# Patient Record
Sex: MALE | Race: WHITE | Employment: OTHER | URBAN - METROPOLITAN AREA
[De-identification: names, ages, dates, MRNs, and addresses within clinical notes are randomized per-mention and may not be internally consistent; named-entity substitution may affect disease eponyms.]

---

## 2017-01-16 ENCOUNTER — MYC MEDICAL ADVICE (OUTPATIENT)
Dept: NEUROLOGY | Facility: CLINIC | Age: 62
End: 2017-01-16

## 2017-01-19 ENCOUNTER — TELEPHONE (OUTPATIENT)
Dept: NEUROLOGY | Facility: CLINIC | Age: 62
End: 2017-01-19

## 2017-01-19 NOTE — TELEPHONE ENCOUNTER
[facility] Call Center    Phone Message    Name of Caller: [facility]    Phone Number: Other phone number:  [phone]  Option 2, then option 2 again    Best time to return call: any    May a detailed message be left on voicemail: yes    Relation to patient: Other Name: pharmacy  Relationship: pharmacy  Is there legal documentation in chart to discuss information with this person: No:  Gather information or concern from the caller.  Document in the note but do NOT release any information to the person(s).  Then send message to appropriate person, as requested by the caller.      Reason for Call: Other: pharmacy has questions about IGG infusion medication.  Please call pharmacy to advise.  Next infusion is 01.29.17.  When does Dr Hernandez need loading dose.     Action Taken: Message routed to:  Adult Clinics: Neurology p [id number]

## 2017-01-19 NOTE — TELEPHONE ENCOUNTER
Writer spoke to [facility] and clarified that at his next infusion he is to do the 40g times 2 days and then 2 weeks later go back to 70g.  Micheline Peters RN

## 2017-12-12 ENCOUNTER — OFFICE VISIT (OUTPATIENT)
Dept: PEDIATRICS | Facility: CLINIC | Age: 62
End: 2017-12-12
Payer: COMMERCIAL

## 2017-12-12 ENCOUNTER — TELEPHONE (OUTPATIENT)
Dept: NEUROLOGY | Facility: CLINIC | Age: 62
End: 2017-12-12

## 2017-12-12 ENCOUNTER — OFFICE VISIT (OUTPATIENT)
Dept: NEUROLOGY | Facility: CLINIC | Age: 62
End: 2017-12-12
Payer: COMMERCIAL

## 2017-12-12 VITALS
SYSTOLIC BLOOD PRESSURE: 162 MMHG | OXYGEN SATURATION: 97 % | BODY MASS INDEX: 23.62 KG/M2 | HEIGHT: 70 IN | WEIGHT: 165 LBS | DIASTOLIC BLOOD PRESSURE: 100 MMHG | TEMPERATURE: 97.4 F | HEART RATE: 73 BPM

## 2017-12-12 VITALS
DIASTOLIC BLOOD PRESSURE: 108 MMHG | HEART RATE: 77 BPM | BODY MASS INDEX: 23.62 KG/M2 | HEIGHT: 70 IN | TEMPERATURE: 97.6 F | OXYGEN SATURATION: 98 % | WEIGHT: 165 LBS | SYSTOLIC BLOOD PRESSURE: 170 MMHG

## 2017-12-12 DIAGNOSIS — G61.82 MMN (MULTIFOCAL MOTOR NEUROPATHY) (H): ICD-10-CM

## 2017-12-12 DIAGNOSIS — Z13.29 SCREENING FOR THYROID DISORDER: ICD-10-CM

## 2017-12-12 DIAGNOSIS — R03.0 ELEVATED BLOOD PRESSURE READING WITHOUT DIAGNOSIS OF HYPERTENSION: Primary | ICD-10-CM

## 2017-12-12 LAB
ANION GAP SERPL CALCULATED.3IONS-SCNC: 3 MMOL/L (ref 3–14)
BUN SERPL-MCNC: 11 MG/DL (ref 7–30)
CALCIUM SERPL-MCNC: 9.4 MG/DL (ref 8.5–10.1)
CHLORIDE SERPL-SCNC: 103 MMOL/L (ref 94–109)
CO2 SERPL-SCNC: 32 MMOL/L (ref 20–32)
CREAT SERPL-MCNC: 0.78 MG/DL (ref 0.66–1.25)
GFR SERPL CREATININE-BSD FRML MDRD: >90 ML/MIN/1.7M2
GLUCOSE SERPL-MCNC: 99 MG/DL (ref 70–99)
POTASSIUM SERPL-SCNC: 4.2 MMOL/L (ref 3.4–5.3)
SODIUM SERPL-SCNC: 138 MMOL/L (ref 133–144)
TSH SERPL DL<=0.005 MIU/L-ACNC: 2.04 MU/L (ref 0.4–4)

## 2017-12-12 PROCEDURE — 99214 OFFICE O/P EST MOD 30 MIN: CPT | Performed by: NURSE PRACTITIONER

## 2017-12-12 PROCEDURE — 80048 BASIC METABOLIC PNL TOTAL CA: CPT | Performed by: NURSE PRACTITIONER

## 2017-12-12 PROCEDURE — 99213 OFFICE O/P EST LOW 20 MIN: CPT | Performed by: PSYCHIATRY & NEUROLOGY

## 2017-12-12 PROCEDURE — 36415 COLL VENOUS BLD VENIPUNCTURE: CPT | Performed by: NURSE PRACTITIONER

## 2017-12-12 PROCEDURE — 84443 ASSAY THYROID STIM HORMONE: CPT | Performed by: NURSE PRACTITIONER

## 2017-12-12 RX ORDER — ATORVASTATIN CALCIUM 40 MG/1
40 TABLET, FILM COATED ORAL DAILY
Refills: 2 | COMMUNITY
Start: 2017-11-14

## 2017-12-12 NOTE — PATIENT INSTRUCTIONS
PLAN:   1.  Orders Placed This Encounter   Procedures     Basic metabolic panel     TSH with free T4 reflex       2. Patient needs to follow up in if no improvement,or sooner if worsening of symptoms or other symptoms develop.  Will follow up and/or notify patient of  results via My Chart to determine further need for followup  Please make follow up appointment with regular provider and recheck BP there  May need to consider a 24 hour BP monitor as Blood pressure at visits at home office have all been in normal range.     It was a pleasure seeing you today at the [facility]. Thank you for allowing us to care for you today. We truly hope we provided you with the excellent service you deserve. Please let us know if there is anything else we can do for you so we can be sure you are leaving completley satisfied with your care experience.       General information about your clinic   Clinic Hours Lab Hours (Appointments are required)   Mon-Thurs: 7:30 AM - 7 PM Mon-Thurs: 7:30 AM - 7 PM   Fri: 7:30 AM - 5 PM Fri: 7:30 AM - 5 PM        After Hours Nurse Advise & Appts:  [facility]: [phone]  [facility] On Call: to make appointments anytime: [phone] On Call Physician: call [phone] and answering service will page the on call physician.        For urgent appointments, please call [phone] and ask for the triage nurse or your care team clinic nurse.  How to contact my care team:  MyChart: [email or web address]   Phone: [phone]   Fax: [phone]       [facility]:   Phone: [phone]  Hours: 8:00 AM - 6:00 PM  Medication Refills:  Call your pharmacy and they will forward the refill to us. Please allow 3 business days for your refills to be completed.       Normal or non-critical lab and imaging results will be communicated to you by MyChart, letter or phone within 7 days.  If you do not hear from us within 10 days, please call the clinic. If you  have a critical or abnormal lab result, we will notify you by phone as soon as possible.       We now have PWIC (Pediatric Walk in Care)  Monday-Friday from 7:30-4. Simply walk in and be seen for your urgent needs like cough, fever, rash, diarrhea or vomiting, pink eye, UTI. No appointments needed. Ask one of the team for more information      -Your Care Team:    Dr. Elham Pantoja - Internal Medicine/Pediatrics   Dr. Rayo Castañeda - Family Medicine  Dr. Sonia Grant - Pediatrics  Dr. Kelley Miller - Pediatrics  Renetta Clarke CNP - Family Practice Nurse Practitioner

## 2017-12-12 NOTE — PROGRESS NOTES
Kourtney Moran,    Attached are your test results.  -Kidney function is normal (Cr, GFR), Sodium is normal, Potassium is normal, Calcium is normal, Glucose is normal (diabetes screening test).   -TSH (thyroid stimulating hormone) level is normal which indicates normal thyroid function.   Please contact us if you have any questions.    Renetta Clarke, CNP

## 2017-12-12 NOTE — TELEPHONE ENCOUNTER
This RN called [facility] to inform them of Dr Hernandez's message below. TORB given to the pharmacist to keep his IVIg dose at 70g every 2 weeks. The pt is scheduled to see Dr Hernandez in follow up in June.  Micheline Peters RN

## 2017-12-12 NOTE — NURSING NOTE
"Chief Complaint   Patient presents with     Hypertension       Initial BP (!) 170/108  Pulse 73  Temp 97.4  F (36.3  C) (Temporal)  Ht 5' 10\" (1.778 m)  Wt 165 lb (74.8 kg)  SpO2 97%  BMI 23.68 kg/m2 Estimated body mass index is 23.68 kg/(m^2) as calculated from the following:    Height as of this encounter: 5' 10\" (1.778 m).    Weight as of this encounter: 165 lb (74.8 kg).  Medication Reconciliation: complete    "

## 2017-12-12 NOTE — TELEPHONE ENCOUNTER
When I saw him in 2016 I reduced to 60 gms; however shortly thereafter he reported worsening weakness and we did return to 70 gms. I was not aware of that when I saw him today and he did not mention it. Let's stay at 70 gms. I would like to see him in 6 months. Thanks.

## 2017-12-12 NOTE — TELEPHONE ENCOUNTER
[facility] Call Center    Phone Message    Name of Caller: Chilo    Phone Number: Home number on file [phone] (home)    Best time to return call: any    May a detailed message be left on voicemail: yes    Relation to patient: Self    Reason for Call: Medication Question or concern regarding medication   Prescription Clarification: immune globulin - sucrose free 10 % injection     Name of Medication: immune globulin - sucrose free 10 % injection     Prescribing Provider: Dr Hernandez   Pharmacy: [facility]   What on the order needs clarification?  Patient stated that the dosage is going to change per Dr Hernandez's request, however the patient stated when it changed in the past it didn't work well for him- patient is requesting to leave it at the 70 mg range  Is patient symptomatic? No. Describe question or concern: .          Action Taken: Message routed to:  Adult Clinics: Neurology p [id number]

## 2017-12-12 NOTE — NURSING NOTE
"Chilo Moran's goals for this visit include: recheck  He requests these members of his care team be copied on today's visit information:     PCP: Joao Cage    Referring Provider:  No referring provider defined for this encounter.    Chief Complaint   Patient presents with     RECHECK       Initial BP (!) 170/109  Pulse 86  Temp 97.6  F (36.4  C) (Oral)  Ht 1.778 m (5' 10\")  Wt 74.8 kg (165 lb)  SpO2 98%  BMI 23.68 kg/m2 Estimated body mass index is 23.68 kg/(m^2) as calculated from the following:    Height as of this encounter: 1.778 m (5' 10\").    Weight as of this encounter: 74.8 kg (165 lb).  Medication Reconciliation: complete    "

## 2017-12-12 NOTE — MR AVS SNAPSHOT
After Visit Summary   12/12/2017    Chilo Moran    MRN: [id number]           Patient Information     Date Of Birth          1955        Visit Information        Provider Department      12/12/2017 8:00 AM Lukas Hernandez MD [facility]        Today's Diagnoses     MMN (multifocal motor neuropathy) (H)          Care Instructions    Thank you for choosing [facility] in [place]. It was a pleasure to see you for your office visit today.     The following is a summary of your office visit:    Medication started today: None    Medication stopped today: None    Medication dose change: Decreasing your IVIg dose to 60g every two week. [facility] has been informed.     Appointments Made today: A 6 month follow up with Dr Hernandez was made for you.     Nurse/clinic contact information: [facility] [phone]    Further instructions for your care: Call if your symptoms change or worsen.             Follow-ups after your visit        Your next 10 appointments already scheduled     Jun 12, 2018  8:00 AM CDT   Return Visit with Lukas Hernandez MD   [facility] ([facility])    [address] [place] [id number] [phone]              Who to contact     If you have questions or need follow up information about today's clinic visit or your schedule please contact [facility] directly at [phone].  Normal or non-critical lab and imaging results will be communicated to you by MyChart, letter or phone within 4 business days after the clinic has received the results. If you do not hear from us within 7 days, please contact the clinic through MyChart or phone. If you have a critical or abnormal lab result, we will notify you by phone as soon as possible.  Submit refill requests through [employer] or call your pharmacy and they will forward the refill request to us. Please allow 3 business days for your  "refill to be completed.          Additional Information About Your Visit        [employer] [employer] Information     [employer] gives you secure access to your electronic health record. If you see a primary care provider, you can also send messages to your care team and make appointments. If you have questions, please call your primary care clinic.  If you do not have a primary care provider, please call [phone] and they will assist you.      [employer] is an electronic gateway that provides easy, online access to your medical records. With [employer], you can request a clinic appointment, read your test results, renew a prescription or communicate with your care team.     To access your existing account, please contact your [facility] or call [phone] for assistance.        Care EveryWhere ID     This is your Care EveryWhere ID. This could be used by other organizations to access your [facility] medical records  [id number]        Your Vitals Were     Pulse Temperature Height Pulse Oximetry BMI (Body Mass Index)       77 97.6  F (36.4  C) (Oral) 5' 10\" (1.778 m) 98% 23.68 kg/m2        Blood Pressure from Last 3 Encounters:   12/12/17 (!) 172/111   12/13/16 (!) 146/102   09/22/15 (!) 142/97    Weight from Last 3 Encounters:   12/12/17 165 lb (74.8 kg)   12/13/16 165 lb 3.2 oz (74.9 kg)   09/22/15 163 lb 14.4 oz (74.3 kg)              Today, you had the following     No orders found for display         Where to get your medicines      Some of these will need a paper prescription and others can be bought over the counter.  Ask your nurse if you have questions.     Bring a paper prescription for each of these medications     immune globulin - sucrose free 10 % injection          Primary Care Provider Office Phone # Fax #    Joao Cage [phone]       [address] [address]        Equal Access to Services     CJ KAHN AH: Rayne lockwood " Werner, eshada lubillyadaha, moniqueta kaines urbano, luz lazaro keflaco laAlbertmarilyn ange. So [facility] [phone].    ATENCIÓN: Si solala aniya, tiene a hyman disposición servicios gratuitos de asistencia lingüística. Celeste al [phone].    We comply with applicable federal civil rights laws and [place] laws. We do not discriminate on the basis of race, color, national origin, age, disability, sex, sexual orientation, or gender identity.            Thank you!     Thank you for choosing [facility]  for your care. Our goal is always to provide you with excellent care. Hearing back from our patients is one way we can continue to improve our services. Please take a few minutes to complete the written survey that you may receive in the mail after your visit with us. Thank you!             Your Updated Medication List - Protect others around you: Learn how to safely use, store and throw away your medicines at www.disposemymeds.org.          This list is accurate as of: 12/12/17  8:51 AM.  Always use your most recent med list.                   Brand Name Dispense Instructions for use Diagnosis    ascorbic acid 500 MG tablet    VITAMIN C     Take 500 mg by mouth daily        atorvastatin 40 MG tablet    LIPITOR          immune globulin - sucrose free 10 % injection     202.44 mL    Inject 60 g into the vein every 14 days Infuse IVIg (Privigen) 60g intravenously via peripheral IV one time every two weeks. This is ordered through [facility].    MMN (multifocal motor neuropathy) (H)       MULTIPLE VITAMIN PO      Take by mouth daily

## 2017-12-12 NOTE — PROGRESS NOTES
2017           Joao Cage MD   [address] [place] [address]       RE: Kaylin Moran         MR #[id number]         :  1955      Dear Dr. Cage:      I saw Kaylin Moran in followup at the [facility], where I have seen him for management of multifocal motor neuropathy.  Mr. Moran reports his condition is stable, and he has noted no change in his mild nondisabling weakness of the left foot.  His RODS score is 50, indicating no disability from his condition.      EXAMINATION:  The patient appears well.  Blood pressure is 170/109.  It was repeated at 172/111.  He is not tachycardic.  He denies chest discomfort, difficulty breathing, headache or confusion.  He says he feels quite well today, but has noticed in the past that when he reduces his exercises, his blood pressure goes up.  With regard to his neuromuscular examination, I find full strength except for the following findings, right/left:  FDI 4+/5, ADM 5-/4 and ankle dorsiflexion 5/4.  Vibration perception is 2 at the great toes.  Pinprick perception is preserved in the right ulnar distribution.      Mr. Moran' examination is not substantially different, although I am observing some mild weakness of intrinsic hand muscles, which has not been documented previously.  When I saw him 1 year ago, I recommended reducing his immunoglobulin dose to 60 grams every 2 weeks. He had a resurgence of symptoms on that dose and we returned to 70 gms. I will maintain that dose now. I have asked that he return in 6 months so that we can monitor his condition more closely.      Finally, with regard to his blood pressure, he was seen for evaluation of this in our primary care clinic today as I know you were not available.      Sincerely,      JONE DUDLEY MD             D: 2017 08:39   T: 2017 11:45   MT: [id number]      Name:     KAYLIN MORAN   MRN:      [id number]        Account:      [id number]   :       1955      Document: [id number]       cc: Joao Cage MD

## 2017-12-12 NOTE — PROGRESS NOTES
SUBJECTIVE:   Chilo Moran is a 62 year old male who presents to clinic today for the following health issues:      Hypertension Follow-up was high today at Neuro appt.       Outpatient blood pressures are not being checked.    Low Salt Diet: low salt        Amount of exercise or physical activity: None    Problems taking medications regularly: No    Medication side effects: none    Diet: regular (no restrictions)    Never been on blood pressure medications before  Has been borderline in the past but states   Takes a low dose   No daily cold medications or NSAIDS   Blood pressure here always seems to run high but at doctor at home has not really been elevated.   Home office never high but here has been elevated.     Problem list and histories reviewed & adjusted, as indicated.  Additional history: as documented    Patient Active Problem List   Diagnosis     MMN (multifocal motor neuropathy) (H)     History reviewed. No pertinent surgical history.    Social History   Substance Use Topics     Smoking status: Former Smoker     Smokeless tobacco: Not on file     Alcohol use Not on file     Family History   Problem Relation Age of Onset     DIABETES Mother      Coronary Artery Disease Father      Hyperlipidemia Father      CEREBROVASCULAR DISEASE No family hx of      Breast Cancer No family hx of      Colon Cancer No family hx of      Prostate Cancer No family hx of      Thyroid Disease No family hx of          Current Outpatient Prescriptions   Medication Sig Dispense Refill     atorvastatin (LIPITOR) 40 MG tablet   2     immune globulin - sucrose free 10 % injection Inject 60 g into the vein every 14 days Infuse IVIg (Privigen) 60g intravenously via peripheral IV one time every two weeks. This is ordered through [facility]. 202.44 mL 11     MULTIPLE VITAMIN PO Take by mouth daily       ascorbic acid (VITAMIN C) 500 MG tablet Take 500 mg by mouth daily       No Known Allergies  BP Readings from Last 3 Encounters:  "  12/12/17 (!) 162/100   12/12/17 (!) 170/108   12/13/16 (!) 146/102    Wt Readings from Last 3 Encounters:   12/12/17 165 lb (74.8 kg)   12/12/17 165 lb (74.8 kg)   12/13/16 165 lb 3.2 oz (74.9 kg)                  Labs reviewed in EPIC        Reviewed and updated as needed this visit by clinical staff     Reviewed and updated as needed this visit by Provider         ROS:  CONSTITUTIONAL:NEGATIVE for fever, chills, change in weight  ENT/MOUTH: NEGATIVE for ear, mouth and throat problems  RESP:NEGATIVE for significant cough or SOB  CV: NEGATIVE for chest pain, palpitations or peripheral edema  GI: NEGATIVE for nausea, abdominal pain, heartburn, or change in bowel habits  MUSCULOSKELETAL: NEGATIVE for significant arthralgias or myalgia  NEURO: POSITIVE for multifocal neuropathy  and NEGATIVE for involuntary movements, gait disturbance and syncope  PSYCHIATRIC: NEGATIVE for changes in mood or affect    OBJECTIVE:     BP (!) 162/100  Pulse 73  Temp 97.4  F (36.3  C) (Temporal)  Ht 5' 10\" (1.778 m)  Wt 165 lb (74.8 kg)  SpO2 97%  BMI 23.68 kg/m2  Body mass index is 23.68 kg/(m^2).   Wt Readings from Last 4 Encounters:   12/12/17 165 lb (74.8 kg)   12/12/17 165 lb (74.8 kg)   12/13/16 165 lb 3.2 oz (74.9 kg)   09/22/15 163 lb 14.4 oz (74.3 kg)       GENERAL APPEARANCE: alert, active and no distress  HENT: ear canals and TM's normal and nose and mouth without ulcers or lesions  RESP: lungs clear to auscultation - no rales, rhonchi or wheezes  CV: regular rates and rhythm, no murmur, click or rub, no irregular beats and peripheral pulses strong  ABDOMEN: soft, non-tender  MS: extremities normal- no gross deformities noted  SKIN: no suspicious lesions or rashes  NEURO: Normal strength and tone, mentation intact, speech normal and normal strength throughout  PSYCH: mentation appears normal and affect normal/bright    Diagnostic Test Results:  Results for orders placed or performed in visit on 12/12/17   Basic metabolic " panel   Result Value Ref Range    Sodium 138 133 - 144 mmol/L    Potassium 4.2 3.4 - 5.3 mmol/L    Chloride 103 94 - 109 mmol/L    Carbon Dioxide 32 20 - 32 mmol/L    Anion Gap 3 3 - 14 mmol/L    Glucose 99 70 - 99 mg/dL    Urea Nitrogen 11 7 - 30 mg/dL    Creatinine 0.78 0.66 - 1.25 mg/dL    GFR Estimate >90 >60 mL/min/1.7m2    GFR Estimate If Black >90 >60 mL/min/1.7m2    Calcium 9.4 8.5 - 10.1 mg/dL   TSH with free T4 reflex   Result Value Ref Range    TSH 2.04 0.40 - 4.00 mU/L       ASSESSMENT/PLAN:       Chilo was seen today for hypertension.    Diagnoses and all orders for this visit:    Elevated blood pressure reading without diagnosis of hypertension  -     Basic metabolic panel    Screening for thyroid disorder  -     TSH with free T4 reflex    PLAN:   Patient needs to follow up in if no improvement,or sooner if worsening of symptoms or other symptoms develop.  Will follow up and/or notify patient of  results via My Chart to determine further need for followup  Please make follow up appointment with regular provider and recheck BP there  May need to consider a 24 hour BP monitor as Blood pressure at visits at home office have all been in normal range  Patient verbalized agreement to plan     See Patient Instructions    ANNABELLA Copeland CNP  [facility]

## 2018-06-12 ENCOUNTER — OFFICE VISIT (OUTPATIENT)
Dept: NEUROLOGY | Facility: CLINIC | Age: 63
End: 2018-06-12
Payer: COMMERCIAL

## 2018-06-12 VITALS
WEIGHT: 162.4 LBS | HEART RATE: 70 BPM | OXYGEN SATURATION: 99 % | DIASTOLIC BLOOD PRESSURE: 91 MMHG | SYSTOLIC BLOOD PRESSURE: 138 MMHG | BODY MASS INDEX: 23.25 KG/M2 | HEIGHT: 70 IN

## 2018-06-12 DIAGNOSIS — G61.82 MMN (MULTIFOCAL MOTOR NEUROPATHY) (H): Primary | ICD-10-CM

## 2018-06-12 PROCEDURE — 99212 OFFICE O/P EST SF 10 MIN: CPT | Performed by: PSYCHIATRY & NEUROLOGY

## 2018-06-12 RX ORDER — LISINOPRIL/HYDROCHLOROTHIAZIDE 10-12.5 MG
1 TABLET ORAL DAILY
Refills: 0 | COMMUNITY
Start: 2018-06-06

## 2018-06-12 ASSESSMENT — PAIN SCALES - GENERAL: PAINLEVEL: NO PAIN (0)

## 2018-06-12 NOTE — NURSING NOTE
"Chilo Moran's goals for this visit include: return  He requests these members of his care team be copied on today's visit information:     PCP: Joao Cage    Referring Provider:  No referring provider defined for this encounter.    BP (!) 138/91  Pulse 70  Ht 1.778 m (5' 10\")  Wt 73.7 kg (162 lb 6.4 oz)  SpO2 99%  BMI 23.3 kg/m2    Do you need any medication refills at today's visit? n  "

## 2018-06-12 NOTE — MR AVS SNAPSHOT
After Visit Summary   6/12/2018    Chilo Moran    MRN: [id number]           Patient Information     Date Of Birth          1955        Visit Information        Provider Department      6/12/2018 8:00 AM Lukas Hernandez MD [facility]        Today's Diagnoses     MMN (multifocal motor neuropathy) (H)    -  1       Follow-ups after your visit        Follow-up notes from your care team     Return in about 6 months (around 12/12/2018).      Your next 10 appointments already scheduled     Dec 18, 2018  8:00 AM CST   Return Visit with Lukas Hernandez MD   [facility] ([facility])    [id number] [place] [id number] [phone]              Who to contact     If you have questions or need follow up information about today's clinic visit or your schedule please contact [facility] directly at [phone].  Normal or non-critical lab and imaging results will be communicated to you by [employer], letter or phone within 4 business days after the clinic has received the results. If you do not hear from us within 7 days, please contact the clinic through [employer] or phone. If you have a critical or abnormal lab result, we will notify you by phone as soon as possible.  Submit refill requests through [employer] or call your pharmacy and they will forward the refill request to us. Please allow 3 business days for your refill to be completed.          Additional Information About Your Visit        MyChart Information     [employer] gives you secure access to your electronic health record. If you see a primary care provider, you can also send messages to your care team and make appointments. If you have questions, please call your primary care clinic.  If you do not have a primary care provider, please call [phone] and they will assist you.      [employer] is an electronic gateway that provides easy, online access to your medical records.  "With MyChart, you can request a clinic appointment, read your test results, renew a prescription or communicate with your care team.     To access your existing account, please contact your [facility] or call [phone] for assistance.        Care EveryWhere ID     This is your Care EveryWhere ID. This could be used by other organizations to access your [facility] medical records  [id number]        Your Vitals Were     Pulse Height Pulse Oximetry BMI (Body Mass Index)          70 1.778 m (5' 10\") 99% 23.3 kg/m2         Blood Pressure from Last 3 Encounters:   06/12/18 (!) 138/91   12/12/17 (!) 162/100   12/12/17 (!) 170/108    Weight from Last 3 Encounters:   06/12/18 73.7 kg (162 lb 6.4 oz)   12/12/17 74.8 kg (165 lb)   12/12/17 74.8 kg (165 lb)              Today, you had the following     No orders found for display       Primary Care Provider Office Phone # Fax #    Joao Cage [phone]       [address] [address]        Equal Access to Services     CJ KAHN : Hadii roslyn ramirezo Sosimón, waaxda luqadaha, qaybta kaalmada adeegyada, luz cassidy. So [facility] [phone].    ATENCIÓN: Si habla español, tiene a hyman disposición servicios gratuitos de asistencia lingüística. [facility] [phone].    We comply with applicable federal civil rights laws and [place] laws. We do not discriminate on the basis of race, color, national origin, age, disability, sex, sexual orientation, or gender identity.            Thank you!     Thank you for choosing [facility]  for your care. Our goal is always to provide you with excellent care. Hearing back from our patients is one way we can continue to improve our services. Please take a few minutes to complete the written survey that you may receive in the mail after your visit with us. Thank you!             Your Updated Medication List - " Protect others around you: Learn how to safely use, store and throw away your medicines at www.disposemymeds.org.          This list is accurate as of 6/12/18  1:08 PM.  Always use your most recent med list.                   Brand Name Dispense Instructions for use Diagnosis    ascorbic acid 500 MG tablet    VITAMIN C     Take 500 mg by mouth daily        atorvastatin 40 MG tablet    LIPITOR     Take 40 mg by mouth daily        immune globulin - sucrose free 10 % injection      Inject 70 g into the vein every 14 days Ordered through [facility].    MMN (multifocal motor neuropathy) (H)       lisinopril-hydrochlorothiazide 10-12.5 MG per tablet    PRINZIDE/ZESTORETIC     Take 1 tablet by mouth daily        MULTIPLE VITAMIN PO      Take by mouth daily

## 2018-06-12 NOTE — LETTER
6/12/2018         RE: Chilo Moran  [address] [place] [id number]        Dear Colleague,    Thank you for referring your patient, Chilo Moran, to the [facility]. Please see a copy of my visit note below.    Return visit for MMN. No new symptoms. Creatinine normal 12/2017.      Mental state: Alert, appropriate, speech, language, and thought content normal.     Manual muscle testing (A indicates atrophy):   Right Left   Shoulder abduction:  5 5   Elbow extension: 5 5   Elbow flexion:  5 5   Wrist extension:  5 5   FDI 5 5   APB 5 5   Hip flexion 5 5   Knee flexion 5 5   Knee extension 5 5   Dorsiflexion 5 4   Plantar flexion 5 5     Inversion and eversion normal on left.    Muscle tone:   Right Left   Upper limb Normal Normal   Lower limb Normal Normal      RODS-MMN 50.    Impression:    MMN. Stable.     Discussed SCIg - not interested.    Plan:    Continue current IVIg. RTC 6 months.    Lukas Hernandez M.D.      Again, thank you for allowing me to participate in the care of your patient.        Sincerely,        Lukas Hernandez MD

## 2018-06-12 NOTE — PROGRESS NOTES
Return visit for MMN. No new symptoms. Creatinine normal 12/2017.      Mental state: Alert, appropriate, speech, language, and thought content normal.     Manual muscle testing (A indicates atrophy):   Right Left   Shoulder abduction:  5 5   Elbow extension: 5 5   Elbow flexion:  5 5   Wrist extension:  5 5   FDI 5 5   APB 5 5   Hip flexion 5 5   Knee flexion 5 5   Knee extension 5 5   Dorsiflexion 5 4   Plantar flexion 5 5     Inversion and eversion normal on left.    Muscle tone:   Right Left   Upper limb Normal Normal   Lower limb Normal Normal      RODS-MMN 50.    Impression:    MMN. Stable.     Discussed SCIg - not interested.    Plan:    Continue current IVIg. RTC 6 months.    Lukas Hernandez M.D.

## 2018-09-17 ENCOUNTER — TRANSFERRED RECORDS (OUTPATIENT)
Dept: HEALTH INFORMATION MANAGEMENT | Facility: CLINIC | Age: 63
End: 2018-09-17

## 2018-09-17 LAB
ALT SERPL-CCNC: 40 U/L (ref 10–65)
AST SERPL-CCNC: 34 U/L (ref 15–37)
CHOLEST SERPL-MCNC: 178 MG/DL (ref 100–200)
CREAT SERPL-MCNC: 0.8 MG/DL (ref 0.6–1.3)
GFR SERPL CREATININE-BSD FRML MDRD: 98 ML/MIN/1.73M2
GLUCOSE SERPL-MCNC: 120 MG/DL (ref 70–99)
HDLC SERPL-MCNC: 47 MG/DL (ref 35–60)
LDLC SERPL CALC-MCNC: 76 MG/DL
POTASSIUM SERPL-SCNC: 3.6 MMOL/L (ref 6.5–5.3)
TRIGL SERPL-MCNC: 274 MG/DL (ref 10–190)

## 2019-01-15 ENCOUNTER — MEDICAL CORRESPONDENCE (OUTPATIENT)
Dept: HEALTH INFORMATION MANAGEMENT | Facility: CLINIC | Age: 64
End: 2019-01-15

## 2019-01-26 ENCOUNTER — MYC REFILL (OUTPATIENT)
Dept: OTHER | Age: 64
End: 2019-01-26

## 2019-01-26 DIAGNOSIS — G61.82 MMN (MULTIFOCAL MOTOR NEUROPATHY) (H): ICD-10-CM

## 2019-01-28 NOTE — TELEPHONE ENCOUNTER
Local Print Rx was faxed to [facility]. Pt informed to call them and request a navarro delivery. Tasha Gonzalez RN

## 2019-01-28 NOTE — TELEPHONE ENCOUNTER
[facility] Call Center    Phone Message    May a detailed message be left on voicemail: yes    Reason for Call: Other: Pt called in about the status of this medication. Pt states he was due to take this yesterday. Please advise.      Action Taken: Message routed to:  Adult Clinics: Neurology p [id number]

## 2019-01-29 ENCOUNTER — TELEPHONE (OUTPATIENT)
Dept: NEUROLOGY | Facility: CLINIC | Age: 64
End: 2019-01-29

## 2019-01-29 NOTE — TELEPHONE ENCOUNTER
Spoke to the pt and would like Dr Hernandez to be aware that he is 2 days late on his infusion (due Sunday), see documentation in the encounter from 1/26/19. It sounds like he may not get it for another couple days.   Normally takes 2 stairs at a time and felt like he may have a little more difficulty than normal but no decline in his left foot drop.   He would like to know if Dr Hernandez would like him to get an increased dose later this week or wait to see how he does after he gets his infusion.  Routed to Dr Hernandez to review and advise.   Micheline Peters RN

## 2019-01-29 NOTE — TELEPHONE ENCOUNTER
[facility] Call Center    Phone Message    May a detailed message be left on voicemail: yes    Reason for Call: Other: Pt is calling really fustrated and desperate (pt's states) requesting a call back regarding his medication (immune globulin - sucrose free 10 % injection) Pt states he has been without medication for 2 days now and needs someone to call him about this he states he has been waiting since yesterday for a call back. Please Advise    Action Taken: Message routed to:  Adult Clinics: Neurology p [id number]

## 2019-01-30 NOTE — TELEPHONE ENCOUNTER
Patient is requesting an urgent call back.  He stated his IV Immune Globulin has not shipped yet and he is going into a deficit.  Please advise.  Thank you.

## 2019-01-30 NOTE — TELEPHONE ENCOUNTER
"Spoke to the pt and he was informed that I spoke to [facility] and they submitted the PA yesterday marked \"URGENT\". In the meantime, they have requested an exception from his insurance company to allow them to ship one dose to his home now while we wait for the PA to get approved.   I reassured him that we everyone is doing what they can to move this process along. I will contact him as soon as I have more information.  Micheline Peters RN    "

## 2019-02-18 ENCOUNTER — OFFICE VISIT (OUTPATIENT)
Dept: UROLOGY | Facility: CLINIC | Age: 64
End: 2019-02-18
Payer: COMMERCIAL

## 2019-02-18 ENCOUNTER — OFFICE VISIT (OUTPATIENT)
Dept: NEUROLOGY | Facility: CLINIC | Age: 64
End: 2019-02-18
Payer: COMMERCIAL

## 2019-02-18 VITALS
HEART RATE: 85 BPM | SYSTOLIC BLOOD PRESSURE: 124 MMHG | DIASTOLIC BLOOD PRESSURE: 81 MMHG | HEIGHT: 70 IN | WEIGHT: 164.4 LBS | BODY MASS INDEX: 23.54 KG/M2

## 2019-02-18 VITALS
WEIGHT: 164 LBS | HEIGHT: 70 IN | DIASTOLIC BLOOD PRESSURE: 81 MMHG | HEART RATE: 85 BPM | BODY MASS INDEX: 23.48 KG/M2 | SYSTOLIC BLOOD PRESSURE: 124 MMHG | OXYGEN SATURATION: 98 %

## 2019-02-18 DIAGNOSIS — N48.6 PEYRONIE'S DISEASE: Primary | ICD-10-CM

## 2019-02-18 DIAGNOSIS — R09.89 ABSENT FOOT PULSE: Primary | ICD-10-CM

## 2019-02-18 PROCEDURE — 99213 OFFICE O/P EST LOW 20 MIN: CPT | Performed by: PSYCHIATRY & NEUROLOGY

## 2019-02-18 PROCEDURE — 99204 OFFICE O/P NEW MOD 45 MIN: CPT | Performed by: UROLOGY

## 2019-02-18 RX ORDER — CEFAZOLIN SODIUM 1 G
1 VIAL (EA) INJECTION SEE ADMIN INSTRUCTIONS
Status: CANCELLED | OUTPATIENT
Start: 2019-02-18

## 2019-02-18 ASSESSMENT — MIFFLIN-ST. JEOR
SCORE: 1546.96
SCORE: 1545.15

## 2019-02-18 ASSESSMENT — PAIN SCALES - GENERAL
PAINLEVEL: NO PAIN (0)
PAINLEVEL: NO PAIN (0)

## 2019-02-18 NOTE — PROGRESS NOTES
I am seeing Chilo Moran in consultation from Joao Cage for evaluation of Peyronie's disease.    HPI:  Chilo Moran is a 64 year old male with Peyronie's disease that was first noticed 5/2017.  He reports difficulty with penetration due to a ~45 degree dorsal curvature at the base of his penis   No history of Dupuytren's contractures.   No ED.  Firmness of erections is adequate for intercourse.    REVIEW OF SYSTEMS:  General: negative  Skin: negative  Eyes: negative  Ears/Nose/Throat: negative  Respiratory: negative  Cardiovascular: negative  Gastrointestinal: negative  Genitourinary: see HPI  Musculoskeletal: negative  Neurologic: negative  Psychiatric: negative  Hematologic/Lymphatic/Immunologic: negative  Endocrine: negative    PAST MEDICAL HX:  He has multifocal motor neuropathy.    PAST SURG HX:  Infusion port.    FAMILY HX:  Family History   Problem Relation Age of Onset     Diabetes Mother      Coronary Artery Disease Father      Hyperlipidemia Father      Cerebrovascular Disease No family hx of      Breast Cancer No family hx of      Colon Cancer No family hx of      Prostate Cancer No family hx of      Thyroid Disease No family hx of        SOCIAL HX:  Social History     Tobacco Use     Smoking status: Former Smoker     Smokeless tobacco: Never Used   Substance Use Topics     Alcohol use: Not on file     Drug use: Not on file       MEDICATIONS:  Current Outpatient Medications   Medication Sig     ascorbic acid (VITAMIN C) 500 MG tablet Take 500 mg by mouth daily     atorvastatin (LIPITOR) 40 MG tablet Take 40 mg by mouth daily      immune globulin - sucrose free 10 % injection Inject 70 g into the vein every 14 days Ordered through [facility].     lisinopril-hydrochlorothiazide (PRINZIDE/ZESTORETIC) 10-12.5 MG per tablet Take 1 tablet by mouth daily     MULTIPLE VITAMIN PO Take by mouth daily     No current facility-administered medications for this visit.        ALLERGIES:  Patient has no known  "allergies.      GENERAL PHYSICAL EXAM:     /81   Pulse 85   Ht 1.778 m (5' 10\")   Wt 74.6 kg (164 lb 6.4 oz)   BMI 23.59 kg/m     Constitutional: No acute distress. Well nourished.   PSYCH: normal mood and affect.  NEURO: normal gait, no focal deficits.   EYES: anicteric, EOMI, PERR.  ENT: neck supple, no lymphadenopathy, mucosae moist, no thrush.  CARDIOPULMONARY: breathing non-labored, pulse regular rate/rhythm, no peripheral edema.  GI: Abdomen soft, non-tender, no surgical scars, no organomegaly.  MUSCULOSKELETAL: normal limb proportions, no muscle wasting, no contractures.  SKIN: Normal virilized hair distribution, no lesions, warts or rashes over genitalia, abdomen extremities or face.  HEME/LYMPH: no ecchymosis, no lymphadenopathy in groin, no lymphedema.     EXAM:  Phallus circumcised, meatus adequate, dense dorsal midline plaque palpated.   Left testis descended, size is normal, consistency is normal. No intra-testicular masses.   Right testis descended, size is normal, consistency is normal. No intra-testicular masses.   Epididymes present, non-tender, not-enlarged.     Prostate exam: deferred     Imaging/labs:  Lab Results   Component Value Date    CR 0.78 12/12/2017    CR 0.77 12/13/2016    CR 0.86 09/22/2015       ASSESSMENT:   Peyronie's disease     PLAN:  Peyronie's disease.  - discussed observation is an option.  - discussed mechanical therapy of Xiaflex plaque injections    - discussed surgical therapies including plication, patch graft, penile prosthesis as a last resort.     He is a good candidate for plication with firm erections and ~45 degree dorsal curve    The risks of plication surgery were explained. I stressed to him that shortening can occur, but we can make him functionally straight rather than perfectly straight to avoid excessive shortening.  The goal is functional, not perfection.  Discussed that de josh ED is about 5% risk with plication.  Other risks include recurrence " of curvature, pain from sutures, palpable sutures.     The risks of plaque excision and grafting were discussed (numbness, continued curvature, de josh erectile dysfunction, pain, infection, possible plication also required).     He would like to proceed with the plication.  Surgery orders made.      Copied cc to Consulting provider Joao Cage        Thank-you for the kind consultation.  Dmitriy Wiley MD     Urological Surgeon

## 2019-02-18 NOTE — PROGRESS NOTES
"Return visit for 64 year old man with MMN. He was doing well until recently, when there was an insurance-related delay in receiving IVIg of 5 days. During that time he developed proximal LE weakness which has since resolved. No other neurological complaints. He does point out in the course of examination that the tip of the left great toe has been \"discolored.\" I do not know the duration of this observation but do not believe it to be abrupt or acute.    R-ODS 48.    Exam:    Mild broad erythema in tip of left great toe, which is quite weak. No skin breakdown, no punctate lesions, no petechiae. Skin is intact and appears well-perfused based upon the general color in the foot, although DP pulse is readily appreciated on the right but not the left. He does not smoke. No swelling.      Mental state: Alert, appropriate, speech, language, and thought content normal.       Sensory examination:   Right Left   Vibration (Rydell-Seiffer) 5 5   Legend:   MM = medial malleolus, TT = tibial tuberosity, K = patella, MCP = MCP joint  MF = mid-foot, DC = distal calf, MC = mid calf, PC = proximal calf      Manual muscle testing (A indicates atrophy):   Right Left   Shoulder abduction  5 5   Elbow extension 5 5   Elbow flexion 5 5   Wrist extension  5 5   Finger extension 4+ 5   FDI 5- 5   APB 5 5   Hip flexion 5 5   Knee flexion 5 5   Knee extension 5 5   Dorsiflexion 5 4-   Plantar flexion 5 5     Muscle tone:   Right Left   Upper limb Normal Normal   Lower limb Normal Normal       Gait:  Normal.      Impression:  1. MMN - trace worsening of MMT grades with no functional compromise. This may reflect improving weakness after recent delay in treatment.   2. Discoloration in toe is not concerning to me, but there is an asymmetry of pedal pulses.    Recommendations (copied from AVS):    1. Continue current infusions.  2. Let me know if you get weaker.  3. We will test the blood flow in your legs.  4. Return in 4-6 months.      Note " that he is up to date on annual creatinine.

## 2019-02-18 NOTE — NURSING NOTE
"Chilo Moran's goals for this visit include: return  He requests these members of his care team be copied on today's visit information:     PCP: Joao Cage    Referring Provider:  No referring provider defined for this encounter.    /81   Pulse 85   Ht 1.778 m (5' 10\")   Wt 74.4 kg (164 lb)   SpO2 98%   BMI 23.53 kg/m      Do you need any medication refills at today's visit? n  "

## 2019-02-18 NOTE — NURSING NOTE
"Chilo Moran's goals for this visit include:   Chief Complaint   Patient presents with     Consult     Peyronie Disease       He requests these members of his care team be copied on today's visit information: yes    PCP: Joao Cage    Referring Provider:  Joao GARDINER [address] [address]    /81   Pulse 85   Ht 1.778 m (5' 10\")   Wt 74.6 kg (164 lb 6.4 oz)   BMI 23.59 kg/m      Do you need any medication refills at today's visit? No    Ana Spain CMA      "

## 2019-02-18 NOTE — PATIENT INSTRUCTIONS
1. Continue current infusions.  2. Let me know if you get weaker.  3. We will test the blood flow in your legs.  4. Return in 4-6 months.

## 2019-02-18 NOTE — LETTER
"    2/18/2019         RE: Chilo Moran  [address] [facility] [address]        Dear Colleague,    Thank you for referring your patient, Chilo Moran, to the [facility]. Please see a copy of my visit note below.    Return visit for 64 year old man with MMN. He was doing well until recently, when there was an insurance-related delay in receiving IVIg of 5 days. During that time he developed proximal LE weakness which has since resolved. No other neurological complaints. He does point out in the course of examination that the tip of the left great toe has been \"discolored.\" I do not know the duration of this observation but do not believe it to be abrupt or acute.    R-ODS 48.    Exam:    Mild broad erythema in tip of left great toe, which is quite weak. No skin breakdown, no punctate lesions, no petechiae. Skin is intact and appears well-perfused based upon the general color in the foot, although DP pulse is readily appreciated on the right but not the left. He does not smoke. No swelling.      Mental state: Alert, appropriate, speech, language, and thought content normal.       Sensory examination:   Right Left   Vibration (Rydell-Seiffer) 5 5   Legend:   MM = medial malleolus, TT = tibial tuberosity, K = patella, MCP = MCP joint  MF = mid-foot, DC = distal calf, MC = mid calf, PC = proximal calf      Manual muscle testing (A indicates atrophy):   Right Left   Shoulder abduction  5 5   Elbow extension 5 5   Elbow flexion 5 5   Wrist extension  5 5   Finger extension 4+ 5   FDI 5- 5   APB 5 5   Hip flexion 5 5   Knee flexion 5 5   Knee extension 5 5   Dorsiflexion 5 4-   Plantar flexion 5 5     Muscle tone:   Right Left   Upper limb Normal Normal   Lower limb Normal Normal       Gait:  Normal.      Impression:  1. MMN - trace worsening of MMT grades with no functional compromise. This may reflect improving weakness after recent delay in treatment.   2. Discoloration in toe is not concerning to " me, but there is an asymmetry of pedal pulses.    Recommendations (copied from AVS):    1. Continue current infusions.  2. Let me know if you get weaker.  3. We will test the blood flow in your legs.  4. Return in 4-6 months.      Note that he is up to date on annual creatinine.      Again, thank you for allowing me to participate in the care of your patient.        Sincerely,        Lukas Hernandez MD

## 2019-02-21 ENCOUNTER — TELEPHONE (OUTPATIENT)
Dept: UROLOGY | Facility: CLINIC | Age: 64
End: 2019-02-21

## 2019-02-21 NOTE — TELEPHONE ENCOUNTER
Patient is scheduled for surgery with Dr. Wiley      Spoke or left message with: Chilo    Date of Surgery: 4/16/19    Location: [facility] OR    Informed patient they will need an adult  yes    Pre-op with surgeon (if applicable): n/a    H&P: Scheduled with pcp    Additional imaging/appointments: n/a    Surgery packet: mailed 2/22/19     Additional comments: n/a

## 2019-04-05 ENCOUNTER — TRANSFERRED RECORDS (OUTPATIENT)
Dept: HEALTH INFORMATION MANAGEMENT | Facility: CLINIC | Age: 64
End: 2019-04-05

## 2019-04-08 ENCOUNTER — TRANSFERRED RECORDS (OUTPATIENT)
Dept: HEALTH INFORMATION MANAGEMENT | Facility: CLINIC | Age: 64
End: 2019-04-08

## 2019-04-09 ENCOUNTER — MEDICAL CORRESPONDENCE (OUTPATIENT)
Dept: HEALTH INFORMATION MANAGEMENT | Facility: CLINIC | Age: 64
End: 2019-04-09

## 2019-04-09 ENCOUNTER — CARE COORDINATION (OUTPATIENT)
Dept: UROLOGY | Facility: CLINIC | Age: 64
End: 2019-04-09

## 2019-04-09 NOTE — PROGRESS NOTES
Left message to see if patient had a pre-op physical done and if he has any questions about the upcoming surgery    Dejah Aldrich RN   Care Coordinator Urology

## 2019-04-12 ENCOUNTER — MYC REFILL (OUTPATIENT)
Dept: NEUROLOGY | Facility: CLINIC | Age: 64
End: 2019-04-12

## 2019-04-12 DIAGNOSIS — G61.82 MMN (MULTIFOCAL MOTOR NEUROPATHY) (H): ICD-10-CM

## 2019-04-12 RX ORDER — DIPHENHYDRAMINE HCL 25 MG
25-50 CAPSULE ORAL ONCE
Status: CANCELLED | OUTPATIENT
Start: 2019-04-12

## 2019-04-12 RX ORDER — HEPARIN SODIUM (PORCINE) LOCK FLUSH IV SOLN 100 UNIT/ML 100 UNIT/ML
5 SOLUTION INTRAVENOUS EVERY 8 HOURS
Status: CANCELLED
Start: 2019-04-12

## 2019-04-12 RX ORDER — LIDOCAINE/PRILOCAINE 2.5 %-2.5%
CREAM (GRAM) TOPICAL ONCE
Status: CANCELLED
Start: 2019-04-12

## 2019-04-12 RX ORDER — EPINEPHRINE 0.3 MG/.3ML
0.3 INJECTION SUBCUTANEOUS ONCE
Status: CANCELLED
Start: 2019-04-12

## 2019-04-15 ENCOUNTER — ANESTHESIA EVENT (OUTPATIENT)
Dept: SURGERY | Facility: AMBULATORY SURGERY CENTER | Age: 64
End: 2019-04-15

## 2019-04-16 ENCOUNTER — ANESTHESIA (OUTPATIENT)
Dept: SURGERY | Facility: AMBULATORY SURGERY CENTER | Age: 64
End: 2019-04-16

## 2019-04-16 ENCOUNTER — HOSPITAL ENCOUNTER (OUTPATIENT)
Facility: AMBULATORY SURGERY CENTER | Age: 64
End: 2019-04-16
Attending: UROLOGY
Payer: COMMERCIAL

## 2019-04-16 VITALS
SYSTOLIC BLOOD PRESSURE: 122 MMHG | TEMPERATURE: 98.5 F | HEIGHT: 70 IN | OXYGEN SATURATION: 99 % | RESPIRATION RATE: 14 BRPM | BODY MASS INDEX: 22.9 KG/M2 | DIASTOLIC BLOOD PRESSURE: 74 MMHG | HEART RATE: 73 BPM | WEIGHT: 160 LBS

## 2019-04-16 DIAGNOSIS — N48.6 PEYRONIE'S DISEASE: Primary | ICD-10-CM

## 2019-04-16 RX ORDER — SODIUM CHLORIDE, SODIUM LACTATE, POTASSIUM CHLORIDE, CALCIUM CHLORIDE 600; 310; 30; 20 MG/100ML; MG/100ML; MG/100ML; MG/100ML
INJECTION, SOLUTION INTRAVENOUS CONTINUOUS
Status: DISCONTINUED | OUTPATIENT
Start: 2019-04-16 | End: 2019-04-16 | Stop reason: HOSPADM

## 2019-04-16 RX ORDER — ONDANSETRON 2 MG/ML
INJECTION INTRAMUSCULAR; INTRAVENOUS PRN
Status: DISCONTINUED | OUTPATIENT
Start: 2019-04-16 | End: 2019-04-16

## 2019-04-16 RX ORDER — NALOXONE HYDROCHLORIDE 0.4 MG/ML
.1-.4 INJECTION, SOLUTION INTRAMUSCULAR; INTRAVENOUS; SUBCUTANEOUS
Status: DISCONTINUED | OUTPATIENT
Start: 2019-04-16 | End: 2019-04-17 | Stop reason: HOSPADM

## 2019-04-16 RX ORDER — LIDOCAINE HYDROCHLORIDE 20 MG/ML
INJECTION, SOLUTION INFILTRATION; PERINEURAL PRN
Status: DISCONTINUED | OUTPATIENT
Start: 2019-04-16 | End: 2019-04-16

## 2019-04-16 RX ORDER — FENTANYL CITRATE 50 UG/ML
INJECTION, SOLUTION INTRAMUSCULAR; INTRAVENOUS PRN
Status: DISCONTINUED | OUTPATIENT
Start: 2019-04-16 | End: 2019-04-16

## 2019-04-16 RX ORDER — OXYCODONE HYDROCHLORIDE 5 MG/1
5 TABLET ORAL EVERY 4 HOURS PRN
Status: DISCONTINUED | OUTPATIENT
Start: 2019-04-16 | End: 2019-04-17 | Stop reason: HOSPADM

## 2019-04-16 RX ORDER — PROPOFOL 10 MG/ML
INJECTION, EMULSION INTRAVENOUS CONTINUOUS PRN
Status: DISCONTINUED | OUTPATIENT
Start: 2019-04-16 | End: 2019-04-16

## 2019-04-16 RX ORDER — PROPOFOL 10 MG/ML
INJECTION, EMULSION INTRAVENOUS PRN
Status: DISCONTINUED | OUTPATIENT
Start: 2019-04-16 | End: 2019-04-16

## 2019-04-16 RX ORDER — MEPERIDINE HYDROCHLORIDE 25 MG/ML
12.5 INJECTION INTRAMUSCULAR; INTRAVENOUS; SUBCUTANEOUS
Status: DISCONTINUED | OUTPATIENT
Start: 2019-04-16 | End: 2019-04-17 | Stop reason: HOSPADM

## 2019-04-16 RX ORDER — GABAPENTIN 300 MG/1
300 CAPSULE ORAL ONCE
Status: COMPLETED | OUTPATIENT
Start: 2019-04-16 | End: 2019-04-16

## 2019-04-16 RX ORDER — HEPARIN SODIUM,PORCINE 10 UNIT/ML
5-10 VIAL (ML) INTRAVENOUS
Status: DISCONTINUED | OUTPATIENT
Start: 2019-04-16 | End: 2019-04-17 | Stop reason: HOSPADM

## 2019-04-16 RX ORDER — AMOXICILLIN 250 MG
1-2 CAPSULE ORAL 2 TIMES DAILY
Qty: 30 TABLET | Refills: 0 | Status: SHIPPED | OUTPATIENT
Start: 2019-04-16 | End: 2020-02-18

## 2019-04-16 RX ORDER — LIDOCAINE 40 MG/G
CREAM TOPICAL
Status: DISCONTINUED | OUTPATIENT
Start: 2019-04-16 | End: 2019-04-16 | Stop reason: HOSPADM

## 2019-04-16 RX ORDER — HEPARIN SODIUM,PORCINE 10 UNIT/ML
5-10 VIAL (ML) INTRAVENOUS EVERY 24 HOURS
Status: DISCONTINUED | OUTPATIENT
Start: 2019-04-16 | End: 2019-04-17 | Stop reason: HOSPADM

## 2019-04-16 RX ORDER — CEFAZOLIN SODIUM 2 G/50ML
2 SOLUTION INTRAVENOUS
Status: COMPLETED | OUTPATIENT
Start: 2019-04-16 | End: 2019-04-16

## 2019-04-16 RX ORDER — OXYCODONE HYDROCHLORIDE 5 MG/1
5 TABLET ORAL ONCE
Status: DISCONTINUED | OUTPATIENT
Start: 2019-04-16 | End: 2019-04-17 | Stop reason: HOSPADM

## 2019-04-16 RX ORDER — HEPARIN SODIUM (PORCINE) LOCK FLUSH IV SOLN 100 UNIT/ML 100 UNIT/ML
5 SOLUTION INTRAVENOUS
Status: DISCONTINUED | OUTPATIENT
Start: 2019-04-16 | End: 2019-04-17 | Stop reason: HOSPADM

## 2019-04-16 RX ORDER — SODIUM CHLORIDE, SODIUM LACTATE, POTASSIUM CHLORIDE, CALCIUM CHLORIDE 600; 310; 30; 20 MG/100ML; MG/100ML; MG/100ML; MG/100ML
INJECTION, SOLUTION INTRAVENOUS CONTINUOUS
Status: DISCONTINUED | OUTPATIENT
Start: 2019-04-16 | End: 2019-04-17 | Stop reason: HOSPADM

## 2019-04-16 RX ORDER — FENTANYL CITRATE 50 UG/ML
25-50 INJECTION, SOLUTION INTRAMUSCULAR; INTRAVENOUS
Status: DISCONTINUED | OUTPATIENT
Start: 2019-04-16 | End: 2019-04-16 | Stop reason: HOSPADM

## 2019-04-16 RX ORDER — BUPIVACAINE HYDROCHLORIDE 5 MG/ML
INJECTION, SOLUTION EPIDURAL; INTRACAUDAL PRN
Status: DISCONTINUED | OUTPATIENT
Start: 2019-04-16 | End: 2019-04-16 | Stop reason: HOSPADM

## 2019-04-16 RX ORDER — LIDOCAINE 40 MG/G
CREAM TOPICAL
Status: DISCONTINUED | OUTPATIENT
Start: 2019-04-16 | End: 2019-04-17 | Stop reason: HOSPADM

## 2019-04-16 RX ORDER — ONDANSETRON 4 MG/1
4 TABLET, ORALLY DISINTEGRATING ORAL EVERY 30 MIN PRN
Status: DISCONTINUED | OUTPATIENT
Start: 2019-04-16 | End: 2019-04-17 | Stop reason: HOSPADM

## 2019-04-16 RX ORDER — OXYCODONE HYDROCHLORIDE 5 MG/1
5 TABLET ORAL EVERY 6 HOURS PRN
Qty: 20 TABLET | Refills: 0 | Status: SHIPPED | OUTPATIENT
Start: 2019-04-16 | End: 2019-04-21

## 2019-04-16 RX ORDER — CEFAZOLIN SODIUM 1 G/50ML
1 SOLUTION INTRAVENOUS SEE ADMIN INSTRUCTIONS
Status: DISCONTINUED | OUTPATIENT
Start: 2019-04-16 | End: 2019-04-16 | Stop reason: HOSPADM

## 2019-04-16 RX ORDER — ACETAMINOPHEN 325 MG/1
975 TABLET ORAL ONCE
Status: COMPLETED | OUTPATIENT
Start: 2019-04-16 | End: 2019-04-16

## 2019-04-16 RX ORDER — ONDANSETRON 2 MG/ML
4 INJECTION INTRAMUSCULAR; INTRAVENOUS EVERY 30 MIN PRN
Status: DISCONTINUED | OUTPATIENT
Start: 2019-04-16 | End: 2019-04-17 | Stop reason: HOSPADM

## 2019-04-16 RX ADMIN — Medication 100 MCG: at 12:43

## 2019-04-16 RX ADMIN — Medication 100 MCG: at 12:49

## 2019-04-16 RX ADMIN — FENTANYL CITRATE 50 MCG: 50 INJECTION, SOLUTION INTRAMUSCULAR; INTRAVENOUS at 12:30

## 2019-04-16 RX ADMIN — CEFAZOLIN SODIUM 2 G: 2 SOLUTION INTRAVENOUS at 12:41

## 2019-04-16 RX ADMIN — ACETAMINOPHEN 975 MG: 325 TABLET ORAL at 10:34

## 2019-04-16 RX ADMIN — PROPOFOL 200 MG: 10 INJECTION, EMULSION INTRAVENOUS at 12:35

## 2019-04-16 RX ADMIN — FENTANYL CITRATE 25 MCG: 50 INJECTION, SOLUTION INTRAMUSCULAR; INTRAVENOUS at 14:09

## 2019-04-16 RX ADMIN — GABAPENTIN 300 MG: 300 CAPSULE ORAL at 10:34

## 2019-04-16 RX ADMIN — FENTANYL CITRATE 25 MCG: 50 INJECTION, SOLUTION INTRAMUSCULAR; INTRAVENOUS at 14:11

## 2019-04-16 RX ADMIN — ONDANSETRON 4 MG: 2 INJECTION INTRAMUSCULAR; INTRAVENOUS at 12:41

## 2019-04-16 RX ADMIN — SODIUM CHLORIDE, SODIUM LACTATE, POTASSIUM CHLORIDE, CALCIUM CHLORIDE: 600; 310; 30; 20 INJECTION, SOLUTION INTRAVENOUS at 10:33

## 2019-04-16 RX ADMIN — PROPOFOL: 10 INJECTION, EMULSION INTRAVENOUS at 14:08

## 2019-04-16 RX ADMIN — HEPARIN SODIUM (PORCINE) LOCK FLUSH IV SOLN 100 UNIT/ML 5 ML: 100 SOLUTION at 15:10

## 2019-04-16 RX ADMIN — PROPOFOL: 10 INJECTION, EMULSION INTRAVENOUS at 13:44

## 2019-04-16 RX ADMIN — PROPOFOL 150 MCG/KG/MIN: 10 INJECTION, EMULSION INTRAVENOUS at 12:34

## 2019-04-16 RX ADMIN — LIDOCAINE HYDROCHLORIDE 100 MG: 20 INJECTION, SOLUTION INFILTRATION; PERINEURAL at 12:35

## 2019-04-16 ASSESSMENT — MIFFLIN-ST. JEOR: SCORE: 1527.01

## 2019-04-16 NOTE — DISCHARGE INSTRUCTIONS
"[facility]  Home Care Following Anesthesia  For 24 hours after surgery:  1. Get plenty of rest.  A responsible adult must stay with you for at least 24 hours after you leave the surgery center.  2. Do not drive or use heavy equipment.  If you have weakness or tingling, don't drive or use heavy equipment until this feeling goes away.   3. Do not drink alcohol.   4. Avoid strenuous or risky activities.  Ask for help when climbing stairs.  5. You may feel lightheaded.  IF so, sit for a few minutes before standing.  Have someone help you get up.   6. If you have nausea (feel sick to your stomach): Drink only clear liquids such as apple juice, ginger ale, broth or 7-Up.  Rest may also help.  Be sure to drink enough fluids.  Move to a regular diet as you feel able.   7. You may have a slight fever.  Call the doctor if your fever is over 100 F (37.7 C) (taken under the tongue) or lasts longer than 24 hours.  8. You may have a dry mouth, a sore throat, muscle aches or trouble sleeping. These should go away after 24 hours.  9. Do not make important or legal decisions.        Today you received a Marcaine or bupivacaine block to numb the nerves near your surgery site.  This is a block using local anesthetic or \"numbing\" medication injected around the nerves to anesthetize or \"numb\" the area supplied by those nerves.  This block is injected into the muscle layer near your surgical site.  The medication may numb the location where you had surgery for 6-18 hours, but may last up to 24 hours.  If your surgical site is an arm or leg you should be careful with your affected limb, since it is possible to injure your limb without being aware of it due to the numbing.  Until full feeling returns, you should guard against bumping or hitting your limb, and avoid extreme hot or cold temperatures on the skin.  As the block wears off, the feeling will return as a tingling or prickly sensation near your " surgical site.  You will experience more discomfort from your incision as the feeling returns.  You may want to take a pain pill (a narcotic or Tylenol if this was prescribed by your surgeon) when you start to experience mild pain before the pain beccomes more severe.  If your pain medications do not control your pain you should notifiy your surgeon.    Tips for taking pain medications  To get the best pain relief possible, remember these points:    Take pain medications as directed, before pain becomes severe.    Pain medication can upset your stomach: taking it with food may help.    Constipation is a common side effect of pain medication. Drink plenty of  fluids.    Eat foods high in fiber. Take a stool softener if recommended by your doctor or pharmacist.    Do not drink alcohol, drive or operate machinery while taking pain medications.    Ask about other ways to control pain, such as with heat, ice or relaxation.    Tylenol/Acetaminophen Consumption  To help encourage the safe use of acetaminophen, the makers of TYLENOL  have lowered the maximum daily dose for single-ingredient Extra Strength TYLENOL  (acetaminophen) products sold in the [place] from 8 pills per day (4,000 mg) to 6 pills per day (3,000 mg). The dosing interval has also changed from 2 pills every 4-6 hours to 2 pills every 6 hours.    If you feel your pain relief is insufficient, you may take Tylenol/Acetaminophen in addition to your narcotic pain medication.     Be careful not to exceed 3,000 mg of Tylenol/Acetaminophen in a 24 hour period from all sources.    If you are taking extra strength Tylenol/acetaminophen (500 mg), the maximum dose is 6 tablets in 24 hours.    If you are taking regular strength acetaminophen (325 mg), the maximum dose is 9 tablets in 24 hours.    Call a doctor for any of the followin. Signs of infection (fever, growing tenderness at the surgery site, a large amount of drainage or bleeding, severe pain, foul-smelling  drainage, redness, swelling).  2. It has been over 8 to 10 hours since surgery and you are still not able to urinate (pass water).  3. Headache for over 24 hours.  Your doctor is:  Dr. Dmitriy Wiley, Prostate and Urology: [phone]                 Or dial [phone] and ask for the resident on call for:  Prostate Urology  For emergency care, call the:  [facility]:  [phone] (TTY for hearing impaired: [phone])

## 2019-04-16 NOTE — BRIEF OP NOTE
[facility]    Brief Operative Note    Pre-operative diagnosis: Peyronie's Disease  Post-operative diagnosis * No post-op diagnosis entered *  Procedure: Procedure(s):  Penile Plication  Surgeon: Surgeon(s) and Role:     * Dmitriy Wiley MD - Primary      * Graham Quijano - Assiting  Anesthesia: General   Estimated blood loss: Minimal  Drains: None  Specimens: * No specimens in log *  Findings:   Approximately 90 degree dorsal curvature, placement of 7 plication sutures  Complications: None.  Implants:  * No implants in log *

## 2019-04-16 NOTE — ANESTHESIA PREPROCEDURE EVALUATION
Anesthesia Pre-Procedure Evaluation    Patient: Chilo Moran   MRN:     [id number] Gender:   male   Age:    64 year old :      1955        Preoperative Diagnosis: Peyronie's Disease   Procedure(s):  Penile Plication     History reviewed. No pertinent past medical history.   History reviewed. No pertinent surgical history.       Anesthesia Evaluation     . Pt has had prior anesthetic.            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 Affects Left foot mostly,     Cardiovascular:     (+) hypertension----. : . . . :. .       METS/Exercise Tolerance:     Hematologic:         Musculoskeletal:         GI/Hepatic:         Renal/Genitourinary:     (+) Other Renal/ Genitourinary, Peyronie's      Endo:         Psychiatric: Comment: Alcohol = or > 6 shots of whiskey per week        Infectious Disease:         Malignancy:         Other:                         PHYSICAL EXAM:   Mental Status/Neuro: A/A/O; Age Appropriate   Airway: Facies: Feasible  Mallampati: I  Mouth/Opening: Full  TM distance: > 6 cm  Neck ROM: Full   Respiratory: Auscultation: CTAB     Resp. Rate: Normal     Resp. Effort: Normal      CV:    Comments:                    Lab Results   Component Value Date    WBC 8.4 2009    HGB 15.4 2009    HCT 46.4 2009     2009     2017    POTASSIUM 3.6 (A) 2018    CHLORIDE 103 2017    CO2 32 2017    BUN 11 2017    CR 0.8 2018     (A) 2018    WILLIAMS 9.4 2017    PROTTOTAL 7.8 2007    ALT 40 2018    AST 34 2018    TSH 2.04 2017       Preop Vitals  BP Readings from Last 3 Encounters:   19 (!) 131/96   19 124/81   19 124/81    Pulse Readings from Last 3 Encounters:   19 75   19 85   19 85      Resp Readings from Last 3 Encounters:   19 16    SpO2 Readings from Last 3 Encounters:  "  04/16/19 97%   02/18/19 98%   06/12/18 99%      Temp Readings from Last 1 Encounters:   04/16/19 36.8  C (98.2  F) (Oral)    Ht Readings from Last 1 Encounters:   04/16/19 1.778 m (5' 10\")      Wt Readings from Last 1 Encounters:   04/16/19 72.6 kg (160 lb)    Estimated body mass index is 22.96 kg/m  as calculated from the following:    Height as of this encounter: 1.778 m (5' 10\").    Weight as of this encounter: 72.6 kg (160 lb).     LDA:  Port A Cath Single 04/16/19 Left Chest wall (Active)   Access Date 04/16/19 4/16/2019 10:40 AM   Access Attempts 1 4/16/2019 10:40 AM   Gauge/Length 20 gauge;1 inch 4/16/2019 10:40 AM   Site Assessment WDL 4/16/2019 10:40 AM   Line Status Blood return noted;Infusing 4/16/2019 10:40 AM   Number of days: 0       Airway - Adult/Peds laryngeal mask airway (Active)   Number of days: 0            Assessment:   ASA SCORE: 2    NPO Status: > 2 hours since completed Clear Liquids; > 6 hours since completed Solid Foods   Documentation: H&P complete; Preop Testing complete; Consents complete   Proceeding: Proceed without further delay  Tobacco Use:  NO Active use of Tobacco/UNKNOWN Tobacco use status     Plan:   Anes. Type:  General   Pre-Induction: Acetaminophen PO   Induction:  IV (Standard)   Airway: LMA   Access/Monitoring: PIV   Maintenance: Balanced; Propofol   Emergence: Procedure Site   Logistics: Same Day Surgery     Postop Pain/Sedation Strategy:  Standard-Options: Opioids PRN     PONV Management:  Adult Risk Factors:, Non-Smoker, Postop Opioids  Prevention: Ondansetron; Propofol Infusion     CONSENT: Direct conversation   Plan and risks discussed with: Patient   Blood Products: Consent Deferred (Minimal Blood Loss)                         Rambo Muhammad MD  "

## 2019-04-16 NOTE — OP NOTE
PREOPERATIVE DIAGNOSIS:  Peyronie's disease  POSTOPERATIVE DIAGNOSIS:  Same    PROCEDURE:   1. Penile plication  2. Excision of skin lesion on foreskin    ANESTHESIA:  General endotracheal  STAFF SURGEON: Dr. Dmitriy Wiley MD  RESIDENT:   Graham Quijano MD  ESTIMATED BLOOD LOSS: 2 mL.   COMPLICATIONS: None immediately.   SPECIMENS: None   DRAINS: None    FINDINGS:Proximal hourglass deformity with 90 degree dorsal curvature, plaque appreciated on physical exam during the case.  Penis straight with artificial erection at the conclusion of the case.     INDICATIONS: Chilo Moran is a(n) 64 year old MALE who has history of Peyronie's disease.  He had reported 45 degree dorsal curvature making intercourse difficult.  After discussion of risks, benefits and alternatives, the patient agreed to proceed with the above named procedures.    DESCRIPTION OF THE PROCEDURE: After obtaining informed consent, the patient was brought to the operating room and placed in the supine position on the operating room table. All pressure points were adequately cushioned and pneumatic compression devices were applied to the patient's bilateral lower extremities. Appropriate preoperative antibiotics were administered. General endotracheal anesthesia was induced without difficulty.  The patient was then prepped and draped in the usual sterile fashion. A timeout was performed to confirm the correct patient, positioning, procedure.     We began by performing a penile block, and artificial erection test was done by injecting injectable saline into the corporal bodies. Once erect, a 90-degree dorsal curvature was noted, there was also severe hourglass deformity.  A circumcising incision was then made and penis was de-gloved proximally along wood's fascia.  We took note of the point of maximal curvature and marked our proposed plication stitch sites on the ventral side.  Wood's fascia was opened ventrally bilaterally.  3 sets of paired plication  stitches with a 7th left lateral stitch were placed.  The artificial erection was again induced and the penis was very straight in the dorsal/ventral direction.  There was some residual hourglass deformity that was not able to be repaired with plication.  Left to right curve was very minimal, less than 5 degrees.    We then closed the Johnson fascia tissue bilaterally using a running 5-0 chromic. The skin was then closed using interrupted 3-0 Chromic sutures. There were two tiny condyloma <1mm noted on the ventral shaft, these were sharply excised.  Bacitracin, cling wrap and Coban was used as a final dressing. The patient was awoken from anesthesia. He tolerated the procedure well and sent to the PACU in good condition. He did not have an erection at the conclusion of the case.     All sponge and instrument counts were correct x2 at the end of the procedure as reported to me.   The patient was awoken from anesthesia and transferred to the PACU in stable condition      I was present and scrubbed for the entire procedure.  Dmitriy Wiley MD  Urology Staff

## 2019-04-16 NOTE — ANESTHESIA CARE TRANSFER NOTE
Patient: Chilo Moran    Procedure(s):  Penile Plication    Diagnosis: Peyronie's Disease  Diagnosis Additional Information: No value filed.    Anesthesia Type:   No value filed.     Note:  Airway :Room Air  Patient transferred to:PACU  Handoff Report: Identifed the Patient, Identified the Reponsible Provider, Reviewed the pertinent medical history, Discussed the surgical course, Reviewed Intra-OP anesthesia mangement and issues during anesthesia, Set expectations for post-procedure period and Allowed opportunity for questions and acknowledgement of understanding      Vitals: (Last set prior to Anesthesia Care Transfer)    CRNA VITALS  4/16/2019 1355 - 4/16/2019 1430      4/16/2019             Pulse:  70    SpO2:  96 %    Resp Rate (observed):  2  (Abnormal)     Resp Rate (set):  10                Electronically Signed By: ANNABELLA Jeong CRNA  April 16, 2019  2:30 PM

## 2019-04-16 NOTE — ANESTHESIA POSTPROCEDURE EVALUATION
Anesthesia POST Procedure Evaluation    Patient: Chilo Moran   MRN:     [id number] Gender:   male   Age:    64 year old :      1955        Preoperative Diagnosis: Peyronie's Disease   Procedure(s):  Penile Plication   Postop Comments: No value filed.       Anesthesia Type:  General    Reportable Event: NO     PAIN: Uncomplicated   Sign Out status: Comfortable, Well controlled pain     PONV: No PONV   Sign Out status:  No Nausea or Vomiting     Neuro/Psych: Uneventful perioperative course   Sign Out Status: Preoperative baseline; Age appropriate mentation     Airway/Resp.: Uneventful perioperative course   Sign Out Status: Non labored breathing, age appropriate RR; Resp. Status within EXPECTED Parameters     CV: Uneventful perioperative course   Sign Out status: Appropriate BP and perfusion indices; Appropriate HR/Rhythm     Disposition:   Sign Out in:  PACU  Disposition:  Phase II; Home  Recovery Course: Uneventful  Follow-Up: Not required           Last Anesthesia Record Vitals:  CRNA VITALS  2019 1355 - 2019 1455      2019             Pulse:  70    SpO2:  96 %    Resp Rate (observed):  2  (Abnormal)     Resp Rate (set):  10          Last PACU Vitals:  Vitals Value Taken Time   /78 2019  2:38 PM   Temp 36.9  C (98.5  F) 2019  2:38 PM   Pulse 70 2019  2:37 PM   Resp 10 2019  2:39 PM   SpO2 96 % 2019  2:39 PM   Temp src     NIBP     Pulse     SpO2     Resp     Temp     Ht Rate     Temp 2     Vitals shown include unvalidated device data.      Electronically Signed By: Rambo Muhammad MD, 2019, 3:04 PM

## 2019-04-18 ENCOUNTER — PATIENT OUTREACH (OUTPATIENT)
Dept: UROLOGY | Facility: CLINIC | Age: 64
End: 2019-04-18

## 2019-04-29 ENCOUNTER — OFFICE VISIT (OUTPATIENT)
Dept: UROLOGY | Facility: CLINIC | Age: 64
End: 2019-04-29
Payer: COMMERCIAL

## 2019-04-29 VITALS
DIASTOLIC BLOOD PRESSURE: 82 MMHG | HEART RATE: 89 BPM | SYSTOLIC BLOOD PRESSURE: 118 MMHG | RESPIRATION RATE: 18 BRPM | OXYGEN SATURATION: 99 %

## 2019-04-29 DIAGNOSIS — N48.6 PEYRONIE'S DISEASE: Primary | ICD-10-CM

## 2019-04-29 DIAGNOSIS — Z09 POSTOP CHECK: ICD-10-CM

## 2019-04-29 PROCEDURE — 99024 POSTOP FOLLOW-UP VISIT: CPT | Performed by: UROLOGY

## 2019-04-29 ASSESSMENT — PAIN SCALES - GENERAL: PAINLEVEL: NO PAIN (0)

## 2019-04-29 NOTE — NURSING NOTE
Chilo Moran's goals for this visit include:   Chief Complaint   Patient presents with     2 week post op     He requests these members of his care team be copied on today's visit information:     PCP: Joao Cage    Referring Provider:  Joao Cage  [facility] [address] [address]    /82 (BP Location: Left arm, Patient Position: Sitting, Cuff Size: Adult Regular)   Pulse 89   Resp 18   SpO2 99%     Do you need any medication refills at today's visit? Zina Morales LPN

## 2019-04-29 NOTE — PROGRESS NOTES
CC: Chilo Moran is post-op from penile plication  done 2 weeks ago.    HPI: Patient is 2 wks post-op.  he has been doing well. No fevers or chills. Pain decreasing.     Exam:     /82 (BP Location: Left arm, Patient Position: Sitting, Cuff Size: Adult Regular)   Pulse 89   Resp 18   SpO2 99%     Alert, NAD.     Respirations normal, non-labored.    Incision is healing well. No discharge, erythema or fluctuence suggestive of infection.     There is typical edema and a little ecchymosis.    He has noticed some shortening, but we discussed this was due to his 90 degree curvature.  Discussed that there was an hourglass deformity that was not able to be corrected.  Erections are straight, he feels.    PLAN:     F/U PRN.    Recommended wrapping the penis and elevating as much as possible to speed clearing of the edema.        Kavon KATHLEEN

## 2019-06-18 ENCOUNTER — OFFICE VISIT (OUTPATIENT)
Dept: NEUROLOGY | Facility: CLINIC | Age: 64
End: 2019-06-18
Payer: COMMERCIAL

## 2019-06-18 VITALS
OXYGEN SATURATION: 96 % | DIASTOLIC BLOOD PRESSURE: 87 MMHG | HEART RATE: 92 BPM | WEIGHT: 158.2 LBS | BODY MASS INDEX: 22.15 KG/M2 | HEIGHT: 71 IN | SYSTOLIC BLOOD PRESSURE: 124 MMHG

## 2019-06-18 DIAGNOSIS — G61.82 MMN (MULTIFOCAL MOTOR NEUROPATHY) (H): Primary | ICD-10-CM

## 2019-06-18 DIAGNOSIS — R09.89 ABSENT FOOT PULSE: ICD-10-CM

## 2019-06-18 PROCEDURE — 99213 OFFICE O/P EST LOW 20 MIN: CPT | Performed by: PSYCHIATRY & NEUROLOGY

## 2019-06-18 ASSESSMENT — PAIN SCALES - GENERAL: PAINLEVEL: NO PAIN (0)

## 2019-06-18 ASSESSMENT — MIFFLIN-ST. JEOR: SCORE: 1534.72

## 2019-06-18 NOTE — NURSING NOTE
"Chilo Moran's goals for this visit include: return  He requests these members of his care team be copied on today's visit information:     PCP: Joao Cage    Referring Provider:  No referring provider defined for this encounter.    /87   Pulse 92   Ht 1.803 m (5' 11\")   Wt 71.8 kg (158 lb 3.2 oz)   SpO2 96%   BMI 22.06 kg/m      Do you need any medication refills at today's visit? n  "

## 2019-06-18 NOTE — LETTER
6/18/2019         RE: Chilo Moarn  [address] [facility] [address]        Dear Colleague,    Thank you for referring your patient, Chilo Moran, to the [facility]. Please see a copy of my visit note below.    Return visit for 64 year old man with MMN. He reports returning to prior stable state after worsening when IVIg was not available. No new symptoms. R-ODS 48.    Mental state: Alert, appropriate, speech, language, and thought content normal.       Manual muscle testing (A indicates atrophy):   Right Left   Shoulder abduction  5 5   Elbow extension 5 5   Elbow flexion 5 5   Wrist extension  5 5   Finger extension 5- 5   FDI 4+ 5   APB 5 5   Hip flexion 5 5   Knee flexion 5 5   Knee extension 5 5   Dorsiflexion 5 4-   Plantar flexion 5 5     Muscle tone:   Right Left   Upper limb Normal Normal   Lower limb Normal Normal        Left great toe again demonstrates dependent rubor, which improves when elevated. Good capillary refill. Non-smoker. Pedal pulses present right, not clearly present left.    Impression:  Stable MMN. Recent demonstration of IVIg dependence.  Likely venous stasis in left toes, but pulses not readily appreciated on left LE.    Recommendations:  1. No change in IVIg.   2. LLE DAVID  3. RTC 6 months.        Again, thank you for allowing me to participate in the care of your patient.        Sincerely,        Lukas Hernandez MD

## 2019-06-18 NOTE — PROGRESS NOTES
Return visit for 64 year old man with MMN. He reports returning to prior stable state after worsening when IVIg was not available. No new symptoms. R-ODS 48.    Mental state: Alert, appropriate, speech, language, and thought content normal.       Manual muscle testing (A indicates atrophy):   Right Left   Shoulder abduction  5 5   Elbow extension 5 5   Elbow flexion 5 5   Wrist extension  5 5   Finger extension 5- 5   FDI 4+ 5   APB 5 5   Hip flexion 5 5   Knee flexion 5 5   Knee extension 5 5   Dorsiflexion 5 4-   Plantar flexion 5 5     Muscle tone:   Right Left   Upper limb Normal Normal   Lower limb Normal Normal        Left great toe again demonstrates dependent rubor, which improves when elevated. Good capillary refill. Non-smoker. Pedal pulses present right, not clearly present left.    Impression:  Stable MMN. Recent demonstration of IVIg dependence.  Likely venous stasis in left toes, but pulses not readily appreciated on left LE.    Recommendations:  1. No change in IVIg.   2. LLE DAVID  3. RTC 6 months.

## 2019-07-16 ENCOUNTER — MYC MEDICAL ADVICE (OUTPATIENT)
Dept: NEUROLOGY | Facility: CLINIC | Age: 64
End: 2019-07-16

## 2019-07-24 ENCOUNTER — MEDICAL CORRESPONDENCE (OUTPATIENT)
Dept: HEALTH INFORMATION MANAGEMENT | Facility: CLINIC | Age: 64
End: 2019-07-24

## 2019-09-25 ENCOUNTER — MYC MEDICAL ADVICE (OUTPATIENT)
Dept: NEUROLOGY | Facility: CLINIC | Age: 64
End: 2019-09-25

## 2019-09-25 DIAGNOSIS — G61.82 MMN (MULTIFOCAL MOTOR NEUROPATHY) (H): Primary | ICD-10-CM

## 2019-09-26 NOTE — TELEPHONE ENCOUNTER
Discussed with patient by phone. He is not maintaining his strength at 70 gms every two weeks. He did receive rituximab years ago (I think it was 2007) but does not recall if it reduced his IVIg dependence at the time. I have reviewed his records but cannot find the notes from that treatment.    Plan    1. Provide additional dose of 35 gms +/- 5 gms (based upon convenience - the bottles might be 30 gms) next Monday.   2. Obtain serum immunofixation and GM1 antibody level - can do at [facility] in [place].  3. I will try to open records regarding Rituximab therapy. If it was beneficial, I would consider re-treating. Would need insurance pre-authorization.

## 2019-09-27 NOTE — TELEPHONE ENCOUNTER
Lab orders faxed to [facility]. Pt informed, he also was instructed to keep us updated on how booster dose affects him. We will try to find documentation for Rituximab efficacy for pt. Tasha Gonzalez RN

## 2019-09-27 NOTE — TELEPHONE ENCOUNTER
Verbal orders given for booster dose of IVIG 35g 1x the week of 9/30/19. [facility] new order sheet for Dr. Hernandez to sign and return on Tuesday when he is back in clinic. Will pend lab orders for Dr. Hernandez to review/sign. Tasha Gonzalez RN

## 2019-10-07 ENCOUNTER — TRANSFERRED RECORDS (OUTPATIENT)
Dept: HEALTH INFORMATION MANAGEMENT | Facility: CLINIC | Age: 64
End: 2019-10-07

## 2019-10-29 DIAGNOSIS — G61.82 MMN (MULTIFOCAL MOTOR NEUROPATHY) (H): ICD-10-CM

## 2019-10-29 NOTE — TELEPHONE ENCOUNTER
Writer received a refill request from: [email or web address]     Pending Prescriptions:                       Disp   Refills    immune globulin - sucrose free 10 % injec*202.44*5            Sig: Inject 70 g into the vein every 14 days Ordered           through [employer].      Pt's last office visit: 6/18/19  Next scheduled office visit: 2/18/20      Per the RN/LPN medication refill protocol, writer is unable to refill this request. If able to refill, please call the pt to inform them the RX was sent to the pharmacy. If unable to refill, route this encounter to the prescribing physician for authorization or further instructions.      Sent to provider    Jessica Espitia LPN    Message left on Voicemail form [employer] asking to refill stating pt wants mailed out today.  Verbal RX can be called to [phone] option #2

## 2019-11-01 ENCOUNTER — MYC REFILL (OUTPATIENT)
Dept: NEUROLOGY | Facility: CLINIC | Age: 64
End: 2019-11-01

## 2019-11-01 ENCOUNTER — MYC MEDICAL ADVICE (OUTPATIENT)
Dept: NEUROLOGY | Facility: CLINIC | Age: 64
End: 2019-11-01

## 2019-11-01 DIAGNOSIS — G61.82 MMN (MULTIFOCAL MOTOR NEUROPATHY) (H): ICD-10-CM

## 2019-11-01 NOTE — TELEPHONE ENCOUNTER
Verbal Rx provided to [facility]. Did ask that they re-fax their therapy plan Rx to us so that Dr. Hernandez can sign it and we can return it to them by fax. Tasha Gonzalez RN

## 2019-11-01 NOTE — TELEPHONE ENCOUNTER
The pt called to request that we read him the [employer] message sent to him this morning.   He was advised to call [facility] to verify shipping information and let then know that he needs this medication by tomorrow because he is due to infuse on Sunday.  Micheline Peters, RNCC  Neurology

## 2019-11-06 NOTE — TELEPHONE ENCOUNTER
[facility] called again and requested that refill authorization be faxed it.  I called them back at [phone] option #2 and requested that refill request be faxed to [phone]. Pharmacy has already been given a verbal and signed RX was faxed to [facility] at [phone]    Jessica Espitia LPN

## 2019-11-07 ENCOUNTER — HEALTH MAINTENANCE LETTER (OUTPATIENT)
Age: 64
End: 2019-11-07

## 2020-02-18 ENCOUNTER — OFFICE VISIT (OUTPATIENT)
Dept: NEUROLOGY | Facility: CLINIC | Age: 65
End: 2020-02-18
Payer: COMMERCIAL

## 2020-02-18 VITALS
WEIGHT: 159.2 LBS | OXYGEN SATURATION: 100 % | BODY MASS INDEX: 22.79 KG/M2 | DIASTOLIC BLOOD PRESSURE: 67 MMHG | HEIGHT: 70 IN | HEART RATE: 53 BPM | SYSTOLIC BLOOD PRESSURE: 106 MMHG

## 2020-02-18 DIAGNOSIS — G61.82 MMN (MULTIFOCAL MOTOR NEUROPATHY) (H): Primary | ICD-10-CM

## 2020-02-18 LAB
ALBUMIN SERPL-MCNC: 3.7 G/DL (ref 3.4–5)
ALP SERPL-CCNC: 48 U/L (ref 40–150)
ALT SERPL W P-5'-P-CCNC: 40 U/L (ref 0–70)
ANION GAP SERPL CALCULATED.3IONS-SCNC: 4 MMOL/L (ref 3–14)
AST SERPL W P-5'-P-CCNC: 36 U/L (ref 0–45)
BASOPHILS # BLD AUTO: 0 10E9/L (ref 0–0.2)
BASOPHILS NFR BLD AUTO: 0.6 %
BILIRUB SERPL-MCNC: 0.4 MG/DL (ref 0.2–1.3)
BUN SERPL-MCNC: 14 MG/DL (ref 7–30)
CALCIUM SERPL-MCNC: 9.8 MG/DL (ref 8.5–10.1)
CHLORIDE SERPL-SCNC: 101 MMOL/L (ref 94–109)
CO2 SERPL-SCNC: 30 MMOL/L (ref 20–32)
CREAT SERPL-MCNC: 0.83 MG/DL (ref 0.66–1.25)
DIFFERENTIAL METHOD BLD: NORMAL
EOSINOPHIL # BLD AUTO: 0.1 10E9/L (ref 0–0.7)
EOSINOPHIL NFR BLD AUTO: 1.2 %
ERYTHROCYTE [DISTWIDTH] IN BLOOD BY AUTOMATED COUNT: 13.1 % (ref 10–15)
GFR SERPL CREATININE-BSD FRML MDRD: >90 ML/MIN/{1.73_M2}
GLUCOSE SERPL-MCNC: 110 MG/DL (ref 70–99)
HBV SURFACE AG SERPL QL IA: NONREACTIVE
HCT VFR BLD AUTO: 41.9 % (ref 40–53)
HGB BLD-MCNC: 14.2 G/DL (ref 13.3–17.7)
IMM GRANULOCYTES # BLD: 0 10E9/L (ref 0–0.4)
IMM GRANULOCYTES NFR BLD: 0.2 %
LYMPHOCYTES # BLD AUTO: 1.9 10E9/L (ref 0.8–5.3)
LYMPHOCYTES NFR BLD AUTO: 36.5 %
MCH RBC QN AUTO: 30.7 PG (ref 26.5–33)
MCHC RBC AUTO-ENTMCNC: 33.9 G/DL (ref 31.5–36.5)
MCV RBC AUTO: 91 FL (ref 78–100)
MONOCYTES # BLD AUTO: 0.5 10E9/L (ref 0–1.3)
MONOCYTES NFR BLD AUTO: 10 %
NEUTROPHILS # BLD AUTO: 2.6 10E9/L (ref 1.6–8.3)
NEUTROPHILS NFR BLD AUTO: 51.5 %
PLATELET # BLD AUTO: 332 10E9/L (ref 150–450)
POTASSIUM SERPL-SCNC: 4.4 MMOL/L (ref 3.4–5.3)
PROT SERPL-MCNC: 9.6 G/DL (ref 6.8–8.8)
RBC # BLD AUTO: 4.62 10E12/L (ref 4.4–5.9)
SODIUM SERPL-SCNC: 135 MMOL/L (ref 133–144)
WBC # BLD AUTO: 5.1 10E9/L (ref 4–11)

## 2020-02-18 PROCEDURE — 99214 OFFICE O/P EST MOD 30 MIN: CPT | Performed by: PSYCHIATRY & NEUROLOGY

## 2020-02-18 PROCEDURE — 80053 COMPREHEN METABOLIC PANEL: CPT | Performed by: PSYCHIATRY & NEUROLOGY

## 2020-02-18 PROCEDURE — 36415 COLL VENOUS BLD VENIPUNCTURE: CPT | Performed by: PSYCHIATRY & NEUROLOGY

## 2020-02-18 PROCEDURE — 86706 HEP B SURFACE ANTIBODY: CPT | Performed by: PSYCHIATRY & NEUROLOGY

## 2020-02-18 PROCEDURE — 82784 ASSAY IGA/IGD/IGG/IGM EACH: CPT | Performed by: PSYCHIATRY & NEUROLOGY

## 2020-02-18 PROCEDURE — 86704 HEP B CORE ANTIBODY TOTAL: CPT | Performed by: PSYCHIATRY & NEUROLOGY

## 2020-02-18 PROCEDURE — 85025 COMPLETE CBC W/AUTO DIFF WBC: CPT | Performed by: PSYCHIATRY & NEUROLOGY

## 2020-02-18 PROCEDURE — 87340 HEPATITIS B SURFACE AG IA: CPT | Performed by: PSYCHIATRY & NEUROLOGY

## 2020-02-18 ASSESSMENT — PAIN SCALES - GENERAL: PAINLEVEL: NO PAIN (0)

## 2020-02-18 ASSESSMENT — MIFFLIN-ST. JEOR: SCORE: 1518.38

## 2020-02-18 NOTE — NURSING NOTE
"Chilo Moran's goals for this visit include: return  He requests these members of his care team be copied on today's visit information:     PCP: Joao Cage    Referring Provider:  No referring provider defined for this encounter.    /67   Pulse 53   Ht 1.778 m (5' 10\")   Wt 72.2 kg (159 lb 3.2 oz)   SpO2 100%   BMI 22.84 kg/m      Do you need any medication refills at today's visit? n  "

## 2020-02-18 NOTE — LETTER
"    2020         RE: Chilo Moran  [address] [facility] [address]        Dear Colleague,    Thank you for referring your patient, Chilo Moran, to the [facility]. Please see a copy of my visit note below.    2020            Joao Cage MD   [address] [id number]      Patient:  Chilo Moran   MRN:  [id number]   :  1955      Dear Dr Cage:      I saw Chilo Moran in followup at the [facility] in [place] where I see him for management of multifocal motor neuropathy.  Chilo reports that his condition has changed little since he was seen last; however, he continues to require periodic increases in his immunoglobulin dose, typically given as \"bursts\" between his scheduled infusions.  His dose requirements have gone up over the years and most recently he has noted he is \"wobbly\" in the proximal lower limbs, manifested when climbing stairs, which clearly improves after extra doses of immunoglobulin.  Chilo's current symptoms are not associated with new sphincter disturbances, sensory symptoms or weakness in the proximal upper limbs.    Review of Systems  No past medical history on file.   Social History     Socioeconomic History     Marital status:      Spouse name: None     Number of children: None     Years of education: None     Highest education level: None   Occupational History     None   Social Needs     Financial resource strain: None     Food insecurity:     Worry: None     Inability: None     Transportation needs:     Medical: None     Non-medical: None   Tobacco Use     Smoking status: Former Smoker     Smokeless tobacco: Never Used   Substance and Sexual Activity     Alcohol use: None     Drug use: None     Sexual activity: None   Lifestyle     Physical activity:     Days per week: None     Minutes per session: None     Stress: None   Relationships     Social " connections:     Talks on phone: None     Gets together: None     Attends Qu[employer] service: None     Active member of club or organization: None     Attends meetings of clubs or organizations: None     Relationship status: None     Intimate partner violence:     Fear of current or ex partner: None     Emotionally abused: None     Physically abused: None     Forced sexual activity: None   Other Topics Concern     Parent/sibling w/ CABG, MI or angioplasty before 65F 55M? Not Asked   Social History Narrative     None      Family History   Problem Relation Age of Onset     Diabetes Mother      Coronary Artery Disease Father      Hyperlipidemia Father      Cerebrovascular Disease No family hx of      Breast Cancer No family hx of      Colon Cancer No family hx of      Prostate Cancer No family hx of      Thyroid Disease No family hx of       Examination    Mental state: Alert, appropriate, speech, language, and thought content normal.     Cranial nerves II-XII normal.    Sensory examination:   Right Left   Light touch Normal Normal   Vibration (timed)     Vibration (Rydell-Seiffer) 4 3   Legend:   MM = medial malleolus, TT = tibial tuberosity, K = patella, MCP = MCP joint  MF = mid-foot, DC = distal calf, MC = mid calf, PC = proximal calf      Manual muscle testing (A indicates atrophy):   Right Left   Shoulder abduction  5 5   Elbow extension 5 5   Elbow flexion 5 5   Wrist extension  5 5   Finger extension 5 5   FDI 4+ 5   APB 5 5   Hip flexion 5 5   Knee flexion 5 5   Knee extension 5 5   Dorsiflexion 5 5   Plantar flexion 5 5     Eversion 4-, Inversion 5    Muscle tone:   Right Left   Upper limb Normal Normal   Lower limb Normal Normal        Reflexes:   Right Left   Triceps 2 2   Biceps 0 2   Brachioradialis 0 2   Patellar 2 2   Achilles 0 0        Gait:  Trace steppage. Can rise from a squat and a kneel, but some difficult rising from kneeling with left leg.    I met with Chilo for 25 minutes today to discuss  management, the majority spent in counseling and coordination of care. I am confident that Chilo' new symptoms reflect a worsening of his multifocal motor neuropathy rather than an alternative etiology.  He did respond well and in the past to rituximab infusions.  Specifically, his immunoglobulin needs dropped by greater than 50% after rituximab, which he tolerated well.      With this in mind, we have made arrangements for repeat rituximab infusions.  I am repeating his hepatitis serologies today and assuming that they remain negative and his baseline laboratory studies are acceptable, I will write for a course of rituximab.      Finally, we also discussed some of his weakness at ankle dorsiflexion and eversion.  He has the option of consideration of a tendon transfer; however, neither he nor I are enthusiastic about this consideration at this time.      I will review laboratory results with Chilo when they are available and we will follow up in 6 months.            Sincerely,      JONE DUDLEY MD             D: 2020   T: 2020   MT: MURALI      Name:     CHILO WELLS   MRN:      [id number]        Account:      [id number]   :      1955      Document: [id number]       cc: Joao Cage MD       Again, thank you for allowing me to participate in the care of your patient.        Sincerely,        Jone Dudley MD

## 2020-02-18 NOTE — PROGRESS NOTES
[id number]    Review of Systems  No past medical history on file.   Social History     Socioeconomic History     Marital status:      Spouse name: None     Number of children: None     Years of education: None     Highest education level: None   Occupational History     None   Social Needs     Financial resource strain: None     Food insecurity:     Worry: None     Inability: None     Transportation needs:     Medical: None     Non-medical: None   Tobacco Use     Smoking status: Former Smoker     Smokeless tobacco: Never Used   Substance and Sexual Activity     Alcohol use: None     Drug use: None     Sexual activity: None   Lifestyle     Physical activity:     Days per week: None     Minutes per session: None     Stress: None   Relationships     Social connections:     Talks on phone: None     Gets together: None     Attends [employer]ist service: None     Active member of club or organization: None     Attends meetings of clubs or organizations: None     Relationship status: None     Intimate partner violence:     Fear of current or ex partner: None     Emotionally abused: None     Physically abused: None     Forced sexual activity: None   Other Topics Concern     Parent/sibling w/ CABG, MI or angioplasty before 65F 55M? Not Asked   Social History Narrative     None      Family History   Problem Relation Age of Onset     Diabetes Mother      Coronary Artery Disease Father      Hyperlipidemia Father      Cerebrovascular Disease No family hx of      Breast Cancer No family hx of      Colon Cancer No family hx of      Prostate Cancer No family hx of      Thyroid Disease No family hx of           Mental state: Alert, appropriate, speech, language, and thought content normal.     Cranial nerves II-XII normal.    Sensory examination:   Right Left   Light touch Normal Normal   Vibration (timed)     Vibration (Rydell-Seiffer) 4 3   Legend:   MM = medial malleolus, TT = tibial tuberosity, K = patella, MCP = MCP joint  MF =  mid-foot, DC = distal calf, MC = mid calf, PC = proximal calf      Manual muscle testing (A indicates atrophy):   Right Left   Shoulder abduction  5 5   Elbow extension 5 5   Elbow flexion 5 5   Wrist extension  5 5   Finger extension 5 5   FDI 4+ 5   APB 5 5   Hip flexion 5 5   Knee flexion 5 5   Knee extension 5 5   Dorsiflexion 5 5   Plantar flexion 5 5     Eversion 4-, Inversion 5    Muscle tone:   Right Left   Upper limb Normal Normal   Lower limb Normal Normal        Reflexes:   Right Left   Triceps 2 2   Biceps 0 2   Brachioradialis 0 2   Patellar 2 2   Achilles 0 0        Gait:  Trace steppage. Can rise from a squat and a kneel, but some difficult rising from kneeling with left leg.    Impression:      Recommendations:

## 2020-02-19 LAB
HBV CORE AB SERPL QL IA: REACTIVE
HBV SURFACE AB SERPL IA-ACNC: >1000 M[IU]/ML
IGA SERPL-MCNC: 468 MG/DL (ref 84–499)
IGG SERPL-MCNC: 3012 MG/DL (ref 610–1616)
IGM SERPL-MCNC: 85 MG/DL (ref 35–242)

## 2020-02-19 NOTE — PROGRESS NOTES
"2020            Joao Cage MD   [address] [id number]      Patient:  Chilo Moran   MRN:  [id number]   :  1955      Dear Dr Cage:      I saw Chilo Moran in followup at the [facility] in [place] where I see him for management of multifocal motor neuropathy.  Chilo reports that his condition has changed little since he was seen last; however, he continues to require periodic increases in his immunoglobulin dose, typically given as \"bursts\" between his scheduled infusions.  His dose requirements have gone up over the years and most recently he has noted he is \"wobbly\" in the proximal lower limbs, manifested when climbing stairs, which clearly improves after extra doses of immunoglobulin.  Chilo's current symptoms are not associated with new sphincter disturbances, sensory symptoms or weakness in the proximal upper limbs.    Review of Systems  No past medical history on file.   Social History     Socioeconomic History     Marital status:      Spouse name: None     Number of children: None     Years of education: None     Highest education level: None   Occupational History     None   Social Needs     Financial resource strain: None     Food insecurity:     Worry: None     Inability: None     Transportation needs:     Medical: None     Non-medical: None   Tobacco Use     Smoking status: Former Smoker     Smokeless tobacco: Never Used   Substance and Sexual Activity     Alcohol use: None     Drug use: None     Sexual activity: None   Lifestyle     Physical activity:     Days per week: None     Minutes per session: None     Stress: None   Relationships     Social connections:     Talks on phone: None     Gets together: None     Attends Episcopal service: None     Active member of club or organization: None     Attends meetings of clubs or organizations: None     Relationship status: None     Intimate " partner violence:     Fear of current or ex partner: None     Emotionally abused: None     Physically abused: None     Forced sexual activity: None   Other Topics Concern     Parent/sibling w/ CABG, MI or angioplasty before 65F 55M? Not Asked   Social History Narrative     None      Family History   Problem Relation Age of Onset     Diabetes Mother      Coronary Artery Disease Father      Hyperlipidemia Father      Cerebrovascular Disease No family hx of      Breast Cancer No family hx of      Colon Cancer No family hx of      Prostate Cancer No family hx of      Thyroid Disease No family hx of       Examination    Mental state: Alert, appropriate, speech, language, and thought content normal.     Cranial nerves II-XII normal.    Sensory examination:   Right Left   Light touch Normal Normal   Vibration (timed)     Vibration (Rydell-Seiffer) 4 3   Legend:   MM = medial malleolus, TT = tibial tuberosity, K = patella, MCP = MCP joint  MF = mid-foot, DC = distal calf, MC = mid calf, PC = proximal calf      Manual muscle testing (A indicates atrophy):   Right Left   Shoulder abduction  5 5   Elbow extension 5 5   Elbow flexion 5 5   Wrist extension  5 5   Finger extension 5 5   FDI 4+ 5   APB 5 5   Hip flexion 5 5   Knee flexion 5 5   Knee extension 5 5   Dorsiflexion 5 5   Plantar flexion 5 5     Eversion 4-, Inversion 5    Muscle tone:   Right Left   Upper limb Normal Normal   Lower limb Normal Normal        Reflexes:   Right Left   Triceps 2 2   Biceps 0 2   Brachioradialis 0 2   Patellar 2 2   Achilles 0 0        Gait:  Trace steppage. Can rise from a squat and a kneel, but some difficult rising from kneeling with left leg.    I met with Chilo for 25 minutes today to discuss management, the majority spent in counseling and coordination of care. I am confident that Chilo' new symptoms reflect a worsening of his multifocal motor neuropathy rather than an alternative etiology.  He did respond well and in the past to  rituximab infusions.  Specifically, his immunoglobulin needs dropped by greater than 50% after rituximab, which he tolerated well.      With this in mind, we have made arrangements for repeat rituximab infusions.  I am repeating his hepatitis serologies today and assuming that they remain negative and his baseline laboratory studies are acceptable, I will write for a course of rituximab.      Finally, we also discussed some of his weakness at ankle dorsiflexion and eversion.  He has the option of consideration of a tendon transfer; however, neither he nor I are enthusiastic about this consideration at this time.      I will review laboratory results with Chilo when they are available and we will follow up in 6 months.            Sincerely,      JONE DUDLEY MD             D: 2020   T: 2020   MT: MURALI      Name:     CHILO WELLS   MRN:      [id number]        Account:      [id number]   :      1955      Document: [id number]       cc: Joao Cage MD

## 2020-03-26 ENCOUNTER — MYC REFILL (OUTPATIENT)
Dept: NEUROLOGY | Facility: CLINIC | Age: 65
End: 2020-03-26

## 2020-03-26 DIAGNOSIS — G61.82 MMN (MULTIFOCAL MOTOR NEUROPATHY) (H): ICD-10-CM

## 2020-03-26 NOTE — TELEPHONE ENCOUNTER
Rx Authorization:    Requested Medication/ Dose: Immune Globulin -Sucrose Free 10% injection    Date last refill ordered: 10/29/2019    Quantity ordered: 202.44    # refills: 5    Date of last clinic visit with ordering provider: 2/18/20    Date of next clinic visit with ordering provider: 6/23/20    All pertinent protocol data (lab date/result):     Include pertinent information from patients message:      Subjective     Yenny Ortiz is a 23 year old female who presents to clinic today for the following health issues:    HPI   Anxiety Follow-Up    How are you doing with your anxiety since your last visit? Worsened, due to not having medication    Are you having other symptoms that might be associated with anxiety? Yes:  nervous, irritable    Have you had a significant life event? No     Are you feeling depressed? Yes:  sometimes    Do you have any concerns with your use of alcohol or other drugs? No    Social History     Tobacco Use     Smoking status: Never Smoker     Smokeless tobacco: Never Used   Substance Use Topics     Alcohol use: No     Drug use: No     TJ-7 SCORE 7/13/2018 5/13/2019 1/30/2020   Total Score 19 11 9     PHQ 7/13/2018 5/13/2019 1/30/2020   PHQ-9 Total Score 15 13 12   Q9: Thoughts of better off dead/self-harm past 2 weeks Not at all Not at all Not at all       How many servings of fruits and vegetables do you eat daily?  0-1    On average, how many sweetened beverages do you drink each day (Examples: soda, juice, sweet tea, etc.  Do NOT count diet or artificially sweetened beverages)?   0    How many days per week do you exercise enough to make your heart beat faster? 3 or less    How many minutes a day do you exercise enough to make your heart beat faster? 9 or less  How many days per week do you miss taking your medication? 3    What makes it hard for you to take your medications?  remembering to take    Medication Followup of Imitrex and Propanolol for Migraines    Taking Medication as prescribed: yes    Side Effects:  None    Medication Helping Symptoms:  yes     Needs topical clindamycin refilled. Using for acne. No concerns.     Patient Active Problem List   Diagnosis     Other migraine without status migrainosus, not intractable     Anxiety     Acne, unspecified acne type     Mild episode of recurrent major depressive disorder (H)     History reviewed. No pertinent surgical  "history.    Social History     Tobacco Use     Smoking status: Never Smoker     Smokeless tobacco: Never Used   Substance Use Topics     Alcohol use: No     History reviewed. No pertinent family history.        Reviewed and updated as needed this visit by Provider         Review of Systems   ROS COMP: Constitutional, HEENT, cardiovascular, pulmonary, gi and gu systems are negative, except as otherwise noted.      Objective    /66 (BP Location: Right arm, Patient Position: Chair, Cuff Size: Adult Regular)   Pulse 84   Temp 98  F (36.7  C) (Oral)   Ht 1.73 m (5' 8.11\")   Wt 80.3 kg (177 lb)   LMP 01/22/2020 (Exact Date)   Breastfeeding No   BMI 26.83 kg/m    Body mass index is 26.83 kg/m .  Physical Exam   GENERAL: healthy, alert and no distress  EYES: Eyes grossly normal to inspection, PERRL and conjunctivae and sclerae normal  HENT: ear canals and TM's normal, nose and mouth without ulcers or lesions  NECK: no adenopathy, no asymmetry, masses, or scars and thyroid normal to palpation  RESP: lungs clear to auscultation - no rales, rhonchi or wheezes  CV: regular rate and rhythm, normal S1 S2, no S3 or S4, no murmur, click or rub, no peripheral edema and peripheral pulses strong  ABDOMEN: soft, nontender, no hepatosplenomegaly, no masses and bowel sounds normal  MS: no gross musculoskeletal defects noted, no edema  SKIN: no suspicious lesions or rashes  NEURO: Normal strength and tone, mentation intact and speech normal    Diagnostic Test Results:  Labs reviewed in Epic        Assessment & Plan     (G43.809) Other migraine without status migrainosus, not intractable  (primary encounter diagnosis)  Comment:   Plan: propranolol ER (INDERAL LA) 80 MG 24 hr         capsule, SUMAtriptan (IMITREX) 25 MG tablet        Migraines are well controlled and she is tolerating her medications without side effects. Refills given.     (L70.9) Acne, unspecified acne type  Comment:   Plan: clindamycin (CLEOCIN T) 1 % " external lotion        Refills given     (F41.9) Anxiety  Comment:   Plan: sertraline (ZOLOFT) 25 MG tablet        Sertraline is working well. She has no concerns. Refills given.    I recommended a follow up with PCP for a routine physical/pap, etc.      TRACE Gunn, PA-C

## 2020-04-28 ENCOUNTER — VIRTUAL VISIT (OUTPATIENT)
Dept: GASTROENTEROLOGY | Facility: CLINIC | Age: 65
End: 2020-04-28
Payer: COMMERCIAL

## 2020-04-28 DIAGNOSIS — G61.82 MMN (MULTIFOCAL MOTOR NEUROPATHY) (H): ICD-10-CM

## 2020-04-28 DIAGNOSIS — R76.8 ABNORMAL HEPATITIS SEROLOGY: Primary | ICD-10-CM

## 2020-04-28 PROCEDURE — 99203 OFFICE O/P NEW LOW 30 MIN: CPT | Mod: GT | Performed by: INTERNAL MEDICINE

## 2020-04-28 NOTE — PROGRESS NOTES
"Chilo Moran is a 65 year old male who is being evaluated via a billable video visit.      The patient has been notified of following:     \"This video visit will be conducted via a call between you and your physician/provider. We have found that certain health care needs can be provided without the need for an in-person physical exam.  This service lets us provide the care you need with a video conversation.  If a prescription is necessary we can send it directly to your pharmacy.  If lab work is needed we can place an order for that and you can then stop by our lab to have the test done at a later time.    If during the course of the call the physician/provider feels a video visit is not appropriate, you will not be charged for this service.\"     Patient has given verbal consent for Video visit? Yes    Patient would like the video invitation sent by: Send to e-mail at: [email or web address]    Chilo Moran complains of    Chief Complaint   Patient presents with     New Patient     regarding HBV serologies       I have reviewed and updated the patient's Past Medical History, Social History, Family History and Medication List.    ALLERGIES  Patient has no known allergies.      Video-Visit Details      Originating Location (pt. Location): Home    Distant Location (provider location):  [facility]     Mode of Communication:  Video Conference via Eveline Balderas MA    "

## 2020-04-28 NOTE — PROGRESS NOTES
GASTROENTEROLOGY NEW PATIENT VIDEO VISIT    CC/REFERRING MD:    Joao Cage  No ref. provider found    REASON FOR CONSULTATION:   No ref. provider found for   Chief Complaint   Patient presents with     New Patient     regarding HBV serologies       HISTORY OF PRESENT ILLNESS:    Chilo Moran is a 65 year old male who is being evaluated via a billable video visit.  We completed the video visit on [employer] because [employer] was not working.  The patient is referred by Dr. Hernandez for evaluation of abnormal hepatitis serologies.  The patient has a multifocal motor neuropathy and is undergoing treatment with IVIG for this given that he has progressive muscle weakness.  He reports that he responds quite well to the IVIG.  However, he has been considering another course of rituximab.  He did take rituximab several years ago and reports a good response to this.  As part of the pre-rituximab evaluation, hepatitis serologies were obtained which were notable for a reactive hepatitis B core antibody, hepatitis B surface antibody at a titer of greater than 1000 and a negative hepatitis B surface antigen.  He reports that he does not have any known exposure to hepatitis in the past.  He does not have any history of blood transfusions, foreign travel, incarceration or other known risk factors such as injection drug abuse.  He does not have any prior history of liver disease.  He does not have a family history of liver disease.  There is no significant alcohol use.      I have reviewed and updated the patient's Past Medical History, Social History, Family History and Medication List.    PERTINENT PAST MEDICAL HISTORY:  (I personally reviewed this history with the patient at today's visit)   No past medical history on file.      PREVIOUS SURGERIES: (I personally reviewed this history with the patient at today's visit)   Past Surgical History:   Procedure Laterality Date     RAFA PROCEDURE N/A 4/16/2019    Procedure: Penile  Plication;  Surgeon: Dmitriy Wiley MD;  Location: [facility] OR       ALLERGIES:   No Known Allergies    PERTINENT MEDICATIONS:    Current Outpatient Medications:      ascorbic acid (VITAMIN C) 500 MG tablet, Take 500 mg by mouth daily, Disp: , Rfl:      atorvastatin (LIPITOR) 40 MG tablet, Take 40 mg by mouth daily , Disp: , Rfl: 2     immune globulin - sucrose free 10 % injection, Inject 70 g into the vein every 14 days Ordered through [facility]., Disp: 202.44 mL, Rfl: 5     lisinopril-hydrochlorothiazide (PRINZIDE/ZESTORETIC) 10-12.5 MG per tablet, Take 1 tablet by mouth daily, Disp: , Rfl: 0     MULTIPLE VITAMIN PO, Take by mouth daily, Disp: , Rfl:     SOCIAL HISTORY:  Social History     Socioeconomic History     Marital status:      Spouse name: Not on file     Number of children: Not on file     Years of education: Not on file     Highest education level: Not on file   Occupational History     Not on file   Social Needs     Financial resource strain: Not on file     Food insecurity     Worry: Not on file     Inability: Not on file     Transportation needs     Medical: Not on file     Non-medical: Not on file   Tobacco Use     Smoking status: Former Smoker     Smokeless tobacco: Never Used   Substance and Sexual Activity     Alcohol use: Not on file     Drug use: Not on file     Sexual activity: Not on file   Lifestyle     Physical activity     Days per week: Not on file     Minutes per session: Not on file     Stress: Not on file   Relationships     Social connections     Talks on phone: Not on file     Gets together: Not on file     Attends Islam service: Not on file     Active member of club or organization: Not on file     Attends meetings of clubs or organizations: Not on file     Relationship status: Not on file     Intimate partner violence     Fear of current or ex partner: Not on file     Emotionally abused: Not on file     Physically abused: Not on file     Forced sexual activity: Not on file    Other Topics Concern     Parent/sibling w/ CABG, MI or angioplasty before 65F 55M? Not Asked   Social History Narrative     Not on file       FAMILY HISTORY:   Family History   Problem Relation Age of Onset     Diabetes Mother      Coronary Artery Disease Father      Hyperlipidemia Father      Cerebrovascular Disease No family hx of      Breast Cancer No family hx of      Colon Cancer No family hx of      Prostate Cancer No family hx of      Thyroid Disease No family hx of         Exam:    General appearance:  Healthy appearing adult, in no acute distress  Eyes:  Sclera anicteric, Pupils round and reactive to light  Ears, nose, mouth and throat:  No obvious external lesions of ears and nose.  Hearing intact  Neck:  Symmetric, No obvious external lesions  Respiratory:  Normal respiration, no use of accessory muscles   MSK:  No visual upper extremity, neck or facial muscle atrophy  ABD:  No visual abdominal distention, no audible borborygmi  Skin:  No rashes or jaundice   Psychiatric:  Oriented to person, place and time, Appropriate mood and affect.   Neurologic:  Peripheral muscle function and dexterity appear to be intact      PERTINENT STUDIES have been reviewed.    ASSESSMENT/PLAN:    Chilo Moran is a 65 year old male who presents for evaluation of abnormal hepatitis B serologies.  He has a positive hepatitis B core antibody along with a negative surface antigen and a high titer of hepatitis B surface antibody.  On review of the labs, he did have a previous hepatitis B core antibody test but this was an IgM which is more commonly used to evaluate for acute infection.  It is interesting that he has a high surface antibody titer given that he reports he has never been given hepatitis B vaccination.  It is quite possible that he has the head as the surface antibody and the hepatitis B core antibody as a result of transfer from the IVIG.  However, we cannot rule out that he does have some remote hepatitis B  infection which he has cleared on his own.  It is reassuring that he previously received rituximab and did not have any hepatitis B reactivation.  If he were to receive rituximab in the future, I would be in favor of prophylaxing him with tenofovir or entecavir.  Another option could be to give rituximab with very close monitoring for hepatitis B reactivation (monthly labs) though I think that this is potentially a higher risk scenario and would require a high degree of diligence on the patient's part to ensure that everyone receives his lab work at the proper times.  At this point, he is planning to hold on rituximab initiation given the coronavirus pandemic which is reasonable.  I have ordered hepatitis B DNA PCR, repeat hepatitis surface antigen and liver tests for completeness.  He can get these at his convenience.      Video-Visit Details    Type of service:  Video Visit    Total Video Time: 20     Originating Location (pt. Location): Home    Distant Location (provider location):  [facility]     Mode of Communication:  Video Conference via [employer]    Gomez Bryant MD    RTC PRN if he starts rituximab    Thank you for this consultation.  It was a pleasure to participate in the care of this patient; please contact us with any further questions.      This note was created with voice recognition software, and while reviewed for accuracy, typos may remain.     Gomez Bryant MD  Division of Gastroenterology, Hepatology and Nutrition  [facility]  [phone]

## 2020-06-15 NOTE — PATIENT INSTRUCTIONS
Please see [employer] message. Please advise.   Thank you for choosing [facility] in [place]. It was a pleasure to see you for your office visit today.     The following is a summary of your office visit:    Medication started today: None    Medication stopped today: None    Medication dose change: Decreasing your IVIg dose to 60g every two week. [facility] has been informed.     Appointments Made today: A 6 month follow up with Dr Hernandez was made for you.     Nurse/clinic contact information: [facility] [phone]    Further instructions for your care: Call if your symptoms change or worsen.

## 2020-06-23 ENCOUNTER — VIRTUAL VISIT (OUTPATIENT)
Dept: NEUROLOGY | Facility: CLINIC | Age: 65
End: 2020-06-23
Payer: COMMERCIAL

## 2020-06-23 DIAGNOSIS — G61.82 MMN (MULTIFOCAL MOTOR NEUROPATHY) (H): Primary | ICD-10-CM

## 2020-06-23 PROCEDURE — 99213 OFFICE O/P EST LOW 20 MIN: CPT | Mod: 95 | Performed by: PSYCHIATRY & NEUROLOGY

## 2020-06-23 NOTE — PROGRESS NOTES
"Return visit for 65 year old man with MMN. He reports a return to baseline function after recent boost and increase in IVIg dose to 80 gms every 2 weeks.     Exam: Alert, cooperative. Able to extend, spread, and fist digits of both hands without difficulty. Gait normal. Cannot walk on left heel; right heel and bilateral toe walking normal.     R-ODS MMN 50    Impression:    MN, stable on higher IVIg dose.    Plan (from AVS):    1. We will continue with 80 gms IVIg every 2 weeks for now.  2. If you worsen on the current dose and the CoVID pandemic passes we will reconsider rituximab.  3. If you continue to do well I will reduce IVIg to 70 gms in 2-3 months.   4. Return video visit in 2 months.  5. Continue with GI recommendations per Dr Bryant.      Chilo Moran is a 65 year old male who is being evaluated via a billable video visit.      The patient has been notified of following:     \"This video visit will be conducted via a call between you and your physician/provider. We have found that certain health care needs can be provided without the need for an in-person physical exam.  This service lets us provide the care you need with a video conversation.  If a prescription is necessary we can send it directly to your pharmacy.  If lab work is needed we can place an order for that and you can then stop by our lab to have the test done at a later time.    Video visits are billed at different rates depending on your insurance coverage.  Please reach out to your insurance provider with any questions.    If during the course of the call the physician/provider feels a video visit is not appropriate, you will not be charged for this service.\"    Patient has given verbal consent for Video visit? Yes      Will anyone else be joining your video visit? No           Video-Visit Details    Type of service:  Video Visit    Video Start Time: 10:50  Video End Time: 11:06 AM    Originating Location (pt. Location): Home    Distant " Location (provider location):  [facility]     Platform used for Video Visit: Jesus Hernandez MD

## 2020-06-23 NOTE — LETTER
"    6/23/2020         RE: Chilo Moran  [address] [facility] [address]        Dear Colleague,    Thank you for referring your patient, Chilo Moran, to the [facility]. Please see a copy of my visit note below.    Return visit for 65 year old man with MMN. He reports a return to baseline function after recent boost and increase in IVIg dose to 80 gms every 2 weeks.     Exam: Alert, cooperative. Able to extend, spread, and fist digits of both hands without difficulty. Gait normal. Cannot walk on left heel; right heel and bilateral toe walking normal.     R-ODS MMN 50    Impression:    MN, stable on higher IVIg dose.    Plan (from AVS):    1. We will continue with 80 gms IVIg every 2 weeks for now.  2. If you worsen on the current dose and the CoVID pandemic passes we will reconsider rituximab.  3. If you continue to do well I will reduce IVIg to 70 gms in 2-3 months.   4. Return video visit in 2 months.  5. Continue with GI recommendations per Dr Bryant.      Chilo Moran is a 65 year old male who is being evaluated via a billable video visit.      The patient has been notified of following:     \"This video visit will be conducted via a call between you and your physician/provider. We have found that certain health care needs can be provided without the need for an in-person physical exam.  This service lets us provide the care you need with a video conversation.  If a prescription is necessary we can send it directly to your pharmacy.  If lab work is needed we can place an order for that and you can then stop by our lab to have the test done at a later time.    Video visits are billed at different rates depending on your insurance coverage.  Please reach out to your insurance provider with any questions.    If during the course of the call the physician/provider feels a video visit is not appropriate, you will not be charged for this service.\"    Patient has given verbal consent for Video " visit? Yes      Will anyone else be joining your video visit? No           Video-Visit Details    Type of service:  Video Visit    Video Start Time: 10:50  Video End Time: 11:06 AM    Originating Location (pt. Location): Home    Distant Location (provider location):  [facility]     Platform used for Video Visit: Jesus Hernandez MD          Again, thank you for allowing me to participate in the care of your patient.        Sincerely,        Lukas Hernandez MD

## 2020-06-23 NOTE — PATIENT INSTRUCTIONS
1. We will continue with 80 gms IVIg every 2 weeks for now.  2. If you worsen on the current dose and the CoVID pandemic passes we will reconsider rituximab.  3. If you continue to do well I will reduce IVIg to 70 gms in 2-3 months.   4. Return video visit in 2 months.  5. Continue with GI recommendations per Dr Bryant.

## 2020-08-25 ENCOUNTER — VIRTUAL VISIT (OUTPATIENT)
Dept: NEUROLOGY | Facility: CLINIC | Age: 65
End: 2020-08-25
Payer: COMMERCIAL

## 2020-08-25 DIAGNOSIS — G61.82 MMN (MULTIFOCAL MOTOR NEUROPATHY) (H): Primary | ICD-10-CM

## 2020-08-25 PROCEDURE — 99212 OFFICE O/P EST SF 10 MIN: CPT | Mod: 95 | Performed by: PSYCHIATRY & NEUROLOGY

## 2020-08-25 NOTE — PROGRESS NOTES
"Chilo Moran is a 65 year old male who is being evaluated via a billable video visit.      The patient has been notified of following:     \"This video visit will be conducted via a call between you and your physician/provider. We have found that certain health care needs can be provided without the need for an in-person physical exam.  This service lets us provide the care you need with a video conversation.  If a prescription is necessary we can send it directly to your pharmacy.  If lab work is needed we can place an order for that and you can then stop by our lab to have the test done at a later time.    Video visits are billed at different rates depending on your insurance coverage.  Please reach out to your insurance provider with any questions.    If during the course of the call the physician/provider feels a video visit is not appropriate, you will not be charged for this service.\"    Patient has given verbal consent for Video visit? Yes  How would you like to obtain your AVS? MyChart  If you are dropped from the video visit, the video invite should be resent to: Send to e-mail at: [email or web address]  Will anyone else be joining your video visit? No        Video-Visit Details    Type of service:  Video Visit    Video Start Time: 8:48  Video End Time: 9:02 AM    Originating Location (pt. Location): Home    Distant Location (provider location):  [facility]     Platform used for Video Visit: Jesus Hernandez MD    [id number]      "

## 2020-08-25 NOTE — PATIENT INSTRUCTIONS
We will continue with 80 gms every 2 weeks, as reducing the dose or being late for a dose has clearly resulted in worsening. Return video visit in 3 months. Serum creatinine (blood test) and in-person visit once a year is fine. Since you are doing well on the current regimen we will hold off on rituximab.

## 2020-08-25 NOTE — LETTER
"    2020         RE: Chilo Moran  [address] [place] [id number]        Dear Colleague,    Thank you for referring your patient, Chilo Moran, to the [facility]. Please see a copy of my visit note below.    Chilo Moran is a 65 year old male who is being evaluated via a billable video visit.      The patient has been notified of following:     \"This video visit will be conducted via a call between you and your physician/provider. We have found that certain health care needs can be provided without the need for an in-person physical exam.  This service lets us provide the care you need with a video conversation.  If a prescription is necessary we can send it directly to your pharmacy.  If lab work is needed we can place an order for that and you can then stop by our lab to have the test done at a later time.    Video visits are billed at different rates depending on your insurance coverage.  Please reach out to your insurance provider with any questions.    If during the course of the call the physician/provider feels a video visit is not appropriate, you will not be charged for this service.\"    Patient has given verbal consent for Video visit? Yes  How would you like to obtain your AVS? MyChart  If you are dropped from the video visit, the video invite should be resent to: Send to e-mail at: [email or web address]  Will anyone else be joining your video visit? No        Video-Visit Details    Type of service:  Video Visit    Video Start Time: 8:48  Video End Time: 9:02 AM    Originating Location (pt. Location): Home    Distant Location (provider location):  [facility]     Platform used for Video Visit: Jesus Hernandez MD    [id number]        2020      Joao Cage MD   [address] [id number]      PATIENT:  Chilo Moran   MRN:  [id number]   :  1955      Dear Dr. 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  Mr. Moran reports he is doing as well as he has ever done on his current infusion regimen of 80 grams every 2 weeks.  When we dropped to 70, or briefly delayed treatment, his symptoms worsened.  His current RODS MMN score is 50, which is normal.  Examination demonstrates full use of the limbs.  He rises from a chair and walks independently, but he cannot walk on the heel of the left foot.      We will make no changes in Chilo' medication regimen currently and will not start rituximab at this time.  He will follow up by video in 3 months and I will see him annually in person, at which time I can obtain a creatinine level as well.            Sincerely,      JONE UDDLEY MD             D: 2020   T: 2020   MT: JUNIOR      Name:     CHILO MORAN   MRN:      [id number]        Account:      [id number]   :      1955      Document: [id number]       cc: Joao Cage MD       Again, thank you for allowing me to participate in the care of your patient.        Sincerely,        Jone Dudley MD

## 2020-10-20 ENCOUNTER — MEDICAL CORRESPONDENCE (OUTPATIENT)
Dept: HEALTH INFORMATION MANAGEMENT | Facility: CLINIC | Age: 65
End: 2020-10-20

## 2020-12-06 ENCOUNTER — HEALTH MAINTENANCE LETTER (OUTPATIENT)
Age: 65
End: 2020-12-06

## 2020-12-08 ENCOUNTER — TELEPHONE (OUTPATIENT)
Dept: NEUROLOGY | Facility: CLINIC | Age: 65
End: 2020-12-08

## 2020-12-08 NOTE — TELEPHONE ENCOUNTER
[facility] requesting Dr. Hernandez last note and labs for reauthorization of Gammagard.    Information faxed to [phone]    Jessica Espitia LPN

## 2021-02-11 ENCOUNTER — MYC MEDICAL ADVICE (OUTPATIENT)
Dept: NEUROLOGY | Facility: CLINIC | Age: 66
End: 2021-02-11

## 2021-02-15 NOTE — TELEPHONE ENCOUNTER
I called Patient to schedule a Telephone appt per Dr. Hernandez note below .  Pt says is in agreement with Dr. Hernandez and wants to know if he still needs telephone appt with Dr. Hernandez or if they can leave this as is for now?.    Please advise.    Jessica Espitia LPN

## 2021-02-16 NOTE — TELEPHONE ENCOUNTER
Lukas Hernandez MD  You 15 hours ago (5:17 PM)     He does not have a follow up appointment scheduled. Ideally we should have an in-person scheduled this spring.

## 2021-02-16 NOTE — TELEPHONE ENCOUNTER
I called pt and let him know that Dr. Hernandez wants him to make appt in Spring. Pt states he will call or mychart back to make that appt, he does not have his calendar available right now.    Jessica Espitia LPN

## 2021-03-15 ENCOUNTER — MYC MEDICAL ADVICE (OUTPATIENT)
Dept: NEUROLOGY | Facility: CLINIC | Age: 66
End: 2021-03-15

## 2021-04-11 ENCOUNTER — HEALTH MAINTENANCE LETTER (OUTPATIENT)
Age: 66
End: 2021-04-11

## 2021-05-04 ENCOUNTER — MYC MEDICAL ADVICE (OUTPATIENT)
Dept: NEUROLOGY | Facility: CLINIC | Age: 66
End: 2021-05-04

## 2021-05-10 NOTE — TELEPHONE ENCOUNTER
Pt contacted to complete RODS-MMN questionnaire with a score of 50. Results routed to provider to review and advise.       Gina Garcia, RNCC  Neurology

## 2021-05-13 NOTE — TELEPHONE ENCOUNTER
After speaking with NUBIA PAPPAS I called in a verbal order per  to increase his IViG to 90grams once every 2 weeks and will reassess at his appt in [place]

## 2021-05-13 NOTE — TELEPHONE ENCOUNTER
Neuropathy Assessments  Neurology Assessments 5/10/2021 8/25/2020 6/23/2020   RODS MMN Score 50 50 50         He reports that he is doing well but has modest treatment related fluctuations, with weakness for 2 days after each infusion. We agreed to increase to 90 gms every 2 weeks. Will reassess at upcoming appointment in June.

## 2021-06-22 ENCOUNTER — OFFICE VISIT (OUTPATIENT)
Dept: NEUROLOGY | Facility: CLINIC | Age: 66
End: 2021-06-22
Payer: COMMERCIAL

## 2021-06-22 VITALS
DIASTOLIC BLOOD PRESSURE: 83 MMHG | BODY MASS INDEX: 22.96 KG/M2 | HEART RATE: 74 BPM | RESPIRATION RATE: 12 BRPM | SYSTOLIC BLOOD PRESSURE: 122 MMHG | WEIGHT: 160 LBS

## 2021-06-22 DIAGNOSIS — G61.82 MMN (MULTIFOCAL MOTOR NEUROPATHY) (H): Primary | ICD-10-CM

## 2021-06-22 PROCEDURE — 99213 OFFICE O/P EST LOW 20 MIN: CPT | Performed by: PSYCHIATRY & NEUROLOGY

## 2021-06-22 NOTE — LETTER
6/22/2021         RE: Chilo Moran  [address] [facility] [address]        Dear Colleague,    Thank you for referring your patient, Chilo Moran, to the [facility] [place]. Please see a copy of my visit note below.    Return visit for 66 year old man with MMN. He no longer has treatment-related fluctuations on 90 gms every 2 weeks. Last creatinine Feb 2021 and normal.     Neuropathy Assessments  Neurology Assessments 6/22/2021 5/10/2021 8/25/2020 6/23/2020   RODS MMN Score 50 50 50 50      Strength: 6/22/2021   Right hand strength in lbs: 81   Left hand strength in lbs: 92     Immunotherapy 6/22/2021   Time since last IVIG (days): 9 days   Current treatment: 90 gms IVIg q2wks           Mental state: Alert, appropriate, speech, language, and thought content normal.       Motor examination:     Right Left   Shoulder abduction  5 5   Elbow extension 5 5   Elbow flexion 5 5   Wrist extension  5 5   Finger extension 4+ 5   FDI 4+ 5   APB 5 5   Hip flexion 5 5   Knee flexion 5 5   Knee extension 5 5   Dorsiflexion 5 3   Plantar flexion 5 5   A=atrophy    Tone normal     Reflexes:   Right Left   Biceps 2 2   BRD 2 2   Triceps 2 2   Estuardo 2 2   Patellar 2 2   Achilles 2 2   Plantar Flexor Flexor   Clonus Absent Absent      Coordination:  Finger-nose normal.  Heel-shin normal.  RRMs normal.    Gait:  Normal.      Impression:  Patient prefers to hold off on rituximab for now.     Recommendations:  Continue current dosing.     Return per AVS.    Lukas Hernandez M.D.    25 minutes spent on the date of the encounter on chart review, history and examination, documentation and further activities as noted above.          Again, thank you for allowing me to participate in the care of your patient.        Sincerely,        Lukas Hernandez MD

## 2021-06-22 NOTE — PATIENT INSTRUCTIONS
1) Maintain current dosing.  2) Return virtual visit 4 months. (Pt would like a reminder to schedule this appt in My Chart)  3) Return in-person visit 6-9 months.  4) Hold off on Rituximab for now.

## 2021-06-22 NOTE — PROGRESS NOTES
Return visit for 66 year old man with MMN. He no longer has treatment-related fluctuations on 90 gms every 2 weeks. Last creatinine Feb 2021 and normal.     Neuropathy Assessments  Neurology Assessments 6/22/2021 5/10/2021 8/25/2020 6/23/2020   RODS MMN Score 50 50 50 50      Strength: 6/22/2021   Right hand strength in lbs: 81   Left hand strength in lbs: 92     Immunotherapy 6/22/2021   Time since last IVIG (days): 9 days   Current treatment: 90 gms IVIg q2wks           Mental state: Alert, appropriate, speech, language, and thought content normal.       Motor examination:     Right Left   Shoulder abduction  5 5   Elbow extension 5 5   Elbow flexion 5 5   Wrist extension  5 5   Finger extension 4+ 5   FDI 4+ 5   APB 5 5   Hip flexion 5 5   Knee flexion 5 5   Knee extension 5 5   Dorsiflexion 5 3   Plantar flexion 5 5   A=atrophy    Tone normal     Reflexes:   Right Left   Biceps 2 2   BRD 2 2   Triceps 2 2   Estuardo 2 2   Patellar 2 2   Achilles 2 2   Plantar Flexor Flexor   Clonus Absent Absent      Coordination:  Finger-nose normal.  Heel-shin normal.  RRMs normal.    Gait:  Normal.      Impression:  Patient prefers to hold off on rituximab for now.     Recommendations:  Continue current dosing.     Return per AVS.    Lukas Hernandez M.D.    25 minutes spent on the date of the encounter on chart review, history and examination, documentation and further activities as noted above.

## 2021-09-25 ENCOUNTER — HEALTH MAINTENANCE LETTER (OUTPATIENT)
Age: 66
End: 2021-09-25

## 2021-10-01 ENCOUNTER — VIRTUAL VISIT (OUTPATIENT)
Dept: NEUROLOGY | Facility: CLINIC | Age: 66
End: 2021-10-01
Payer: COMMERCIAL

## 2021-10-01 VITALS — WEIGHT: 150 LBS | BODY MASS INDEX: 21.47 KG/M2 | HEIGHT: 70 IN

## 2021-10-01 DIAGNOSIS — G61.82 MMN (MULTIFOCAL MOTOR NEUROPATHY) (H): Primary | ICD-10-CM

## 2021-10-01 PROCEDURE — 99212 OFFICE O/P EST SF 10 MIN: CPT | Mod: 95 | Performed by: PSYCHIATRY & NEUROLOGY

## 2021-10-01 RX ORDER — ASPIRIN 81 MG/1
81 TABLET, CHEWABLE ORAL
COMMUNITY

## 2021-10-01 ASSESSMENT — MIFFLIN-ST. JEOR: SCORE: 1471.65

## 2021-10-01 NOTE — NURSING NOTE
Chief Complaint   Patient presents with     Follow Up     MMN (multifocal motor neuropathy)     Joya Mann MA on 10/1/2021 at 12:29 PM

## 2021-10-01 NOTE — PROGRESS NOTES
Video visit follow up with 66 year old man with MMN. Since changing to 90 gms every two weeks he no longer has treatment-related fluctuations. Tolerating IVIg well.    Plan: continue current therapy. Return video in 4 months and return in-person in June 2022. Will need a creatinine in 4 months.    Lukas Hernandez M.D.    10 minutes spent on the date of the encounter on chart review, history and examination, documentation and further activities as noted above.

## 2021-10-01 NOTE — PATIENT INSTRUCTIONS
No change in dosing.     Video visit and repeat serum creatinine in 4 months.    In-person return visit in June 2022.

## 2021-10-01 NOTE — LETTER
10/1/2021         RE: Chilo Moran  [address] [facility] [address]        Dear Colleague,    Thank you for referring your patient, Chilo Moran, to the [facility]. Please see a copy of my visit note below.    Video visit follow up with 66 year old man with MMN. Since changing to 90 gms every two weeks he no longer has treatment-related fluctuations. Tolerating IVIg well.    Plan: continue current therapy. Return video in 4 months and return in-person in June 2022. Will need a creatinine in 4 months.    Lukas Hernandez M.D.    10 minutes spent on the date of the encounter on chart review, history and examination, documentation and further activities as noted above.        Again, thank you for allowing me to participate in the care of your patient.        Sincerely,        Lukas Hernandez MD

## 2021-11-04 ENCOUNTER — TELEPHONE (OUTPATIENT)
Dept: NEUROLOGY | Facility: CLINIC | Age: 66
End: 2021-11-04
Payer: COMMERCIAL

## 2021-11-04 NOTE — TELEPHONE ENCOUNTER
Requested documentation re-faxed to the number listed below. Confirmation of delivery was received.  Micheline Peters RN   Neurology Care Coordinator

## 2021-11-04 NOTE — TELEPHONE ENCOUNTER
Rene from [facility] pharmacy calling about PA renewal on immune globulin - sucrose free 10 % injection. He would like to verify that patient has been seen in clinic and had labs completed. He can be reached at [phone] ext. [phone]. Thank you.      WALLY Powell [facility] Rheumatology

## 2022-02-01 ENCOUNTER — VIRTUAL VISIT (OUTPATIENT)
Dept: NEUROLOGY | Facility: CLINIC | Age: 67
End: 2022-02-01
Payer: COMMERCIAL

## 2022-02-01 DIAGNOSIS — G61.82 MMN (MULTIFOCAL MOTOR NEUROPATHY) (H): Primary | ICD-10-CM

## 2022-02-01 PROCEDURE — 99212 OFFICE O/P EST SF 10 MIN: CPT | Mod: 95 | Performed by: PSYCHIATRY & NEUROLOGY

## 2022-02-01 NOTE — PROGRESS NOTES
Return visit for 67 year old man with MMN. He again reports normal function and no treatment-related fluctuations on 90 gms every 2 weeks. Demonstrates excellent use of hands. He had also noted bilateral proximal LE weakness prior to increasing dose from 80 gms, and this has resolved entirely.    I reviewed lab results and orders as well as prior notes and management plans. We discussed management options, including increasing interval between treatments, and discussed the differential diagnosis for his proximal weakness.     Plan:    1. Creatinine. Will be drawn locally.  2. RTC June 2022, in person.    Lukas Hernandez M.D.

## 2022-02-01 NOTE — LETTER
2/1/2022         RE: Chilo Moran  [address] [facility] [address]        Dear Colleague,    Thank you for referring your patient, Chilo Moran, to the [facility] [place]. Please see a copy of my visit note below.    Chilo is a 67 year old who is being evaluated via a billable video visit.      How would you like to obtain your AVS? MyChart  If the video visit is dropped, the invitation should be resent by: Text to cell phone: [phone]  Will anyone else be joining your video visit? No          Return visit for 67 year old man with MMN. He again reports normal function and no treatment-related fluctuations on 90 gms every 2 weeks. Demonstrates excellent use of hands. He had also noted bilateral proximal LE weakness prior to increasing dose from 80 gms, and this has resolved entirely.    I reviewed lab results and orders as well as prior notes and management plans. We discussed management options, including increasing interval between treatments, and discussed the differential diagnosis for his proximal weakness.     Plan:    1. Creatinine. Will be drawn locally.  2. RTC June 2022, in person.    Lukas Hernandez M.D.            Again, thank you for allowing me to participate in the care of your patient.        Sincerely,        Lukas Hernandez MD

## 2022-02-01 NOTE — Clinical Note
1. Patient needs a serum creatinine. Please help arrange for a local draw (fax order to PCP or send to patient via [employer]).  2. Return visit in June, but the 14 or 21 is better for him than current appt of June 7. Please arrange with patient. Thanks.

## 2022-02-02 ENCOUNTER — TELEPHONE (OUTPATIENT)
Dept: NEUROLOGY | Facility: CLINIC | Age: 67
End: 2022-02-02
Payer: COMMERCIAL

## 2022-02-25 DIAGNOSIS — G61.82 MMN (MULTIFOCAL MOTOR NEUROPATHY) (H): Primary | ICD-10-CM

## 2022-02-25 RX ORDER — DIPHENHYDRAMINE HCL 25 MG
50 CAPSULE ORAL ONCE
Status: CANCELLED
Start: 2022-02-25

## 2022-02-25 RX ORDER — METHYLPREDNISOLONE SODIUM SUCCINATE 125 MG/2ML
125 INJECTION, POWDER, LYOPHILIZED, FOR SOLUTION INTRAMUSCULAR; INTRAVENOUS
Status: CANCELLED
Start: 2022-02-25

## 2022-02-25 RX ORDER — DIPHENHYDRAMINE HYDROCHLORIDE 50 MG/ML
50 INJECTION INTRAMUSCULAR; INTRAVENOUS
Status: CANCELLED
Start: 2022-02-25

## 2022-02-25 RX ORDER — HEPARIN SODIUM,PORCINE 10 UNIT/ML
5 VIAL (ML) INTRAVENOUS
Status: CANCELLED | OUTPATIENT
Start: 2022-02-25

## 2022-02-25 RX ORDER — HEPARIN SODIUM (PORCINE) LOCK FLUSH IV SOLN 100 UNIT/ML 100 UNIT/ML
5 SOLUTION INTRAVENOUS
Status: CANCELLED | OUTPATIENT
Start: 2022-02-25

## 2022-02-25 RX ORDER — ACETAMINOPHEN 325 MG/1
650 TABLET ORAL ONCE
Status: CANCELLED
Start: 2022-02-25

## 2022-02-25 RX ORDER — NALOXONE HYDROCHLORIDE 0.4 MG/ML
0.2 INJECTION, SOLUTION INTRAMUSCULAR; INTRAVENOUS; SUBCUTANEOUS
Status: CANCELLED | OUTPATIENT
Start: 2022-02-25

## 2022-02-25 RX ORDER — EPINEPHRINE 1 MG/ML
0.3 INJECTION, SOLUTION, CONCENTRATE INTRAVENOUS EVERY 5 MIN PRN
Status: CANCELLED | OUTPATIENT
Start: 2022-02-25

## 2022-02-25 RX ORDER — MEPERIDINE HYDROCHLORIDE 25 MG/ML
25 INJECTION INTRAMUSCULAR; INTRAVENOUS; SUBCUTANEOUS EVERY 30 MIN PRN
Status: CANCELLED | OUTPATIENT
Start: 2022-02-25

## 2022-02-25 RX ORDER — ALBUTEROL SULFATE 90 UG/1
1-2 AEROSOL, METERED RESPIRATORY (INHALATION)
Status: CANCELLED
Start: 2022-02-25

## 2022-02-25 RX ORDER — ALBUTEROL SULFATE 0.83 MG/ML
2.5 SOLUTION RESPIRATORY (INHALATION)
Status: CANCELLED | OUTPATIENT
Start: 2022-02-25

## 2022-04-26 ENCOUNTER — MEDICAL CORRESPONDENCE (OUTPATIENT)
Dept: HEALTH INFORMATION MANAGEMENT | Facility: CLINIC | Age: 67
End: 2022-04-26
Payer: COMMERCIAL

## 2022-05-07 ENCOUNTER — HEALTH MAINTENANCE LETTER (OUTPATIENT)
Age: 67
End: 2022-05-07

## 2022-05-09 ENCOUNTER — OFFICE VISIT (OUTPATIENT)
Dept: NEUROLOGY | Facility: CLINIC | Age: 67
End: 2022-05-09
Payer: COMMERCIAL

## 2022-05-09 VITALS
HEART RATE: 68 BPM | WEIGHT: 166 LBS | BODY MASS INDEX: 23.82 KG/M2 | SYSTOLIC BLOOD PRESSURE: 126 MMHG | DIASTOLIC BLOOD PRESSURE: 83 MMHG

## 2022-05-09 DIAGNOSIS — G61.82 MMN (MULTIFOCAL MOTOR NEUROPATHY) (H): Primary | ICD-10-CM

## 2022-05-09 PROCEDURE — 99214 OFFICE O/P EST MOD 30 MIN: CPT | Performed by: PSYCHIATRY & NEUROLOGY

## 2022-05-09 NOTE — LETTER
"    5/9/2022         RE: Chilo Moran  [address] [facility] [address]        Dear Colleague,    Thank you for referring your patient, Chilo Moran, to the [facility] [place]. Please see a copy of my visit note below.    Return visit for 67 year old man with MMN. He reports he is developing task-specific lower limb weakness, affecting ability to crouch or walk, requiring higher doses of IVIg. Today, after a \"boost\" and in mid-cycle, he is doing well.       Neuropathy Assessments  Neurology Assessments 5/9/2022 4/22/2022 6/22/2021 5/10/2021 8/25/2020 6/23/2020   RODS MMN Score 50 50 50 50 50 50   ONLS Overall Score: 2 - - - - -      Strength: 5/9/2022 6/22/2021   Right hand strength in lbs: 81 81   Left hand strength in lbs: 85 92     Immunotherapy 5/9/2022 6/22/2021   Time since last IVIG (days): 8 9 days   Current treatment: 100 gms IVIg every 2 weeks 90 gms IVIg q2wks         Motor examination:     Right Left   Shoulder abduction  5 5   Elbow extension 5 5   Elbow flexion 5 5   Wrist extension  5 5   Finger extension 5 5   FDI 5 5   APB 5 5   Hip flexion 5 5   Knee flexion 5 5   Knee extension 5 5   Dorsiflexion 5 3   Plantar flexion 5 5   A=atrophy    Tone normal     Gait:  Steppage.      Impression:  Chilo has responded well to Rituximab in the past, measured as a reduction in his IVIg dependency. However, he has not received the CoVID vaccine. I have substantial concerns about an elective course of Rituximab in this setting and expressed this to him. Alternative is to continue with current IVIg dosing.     Recommendations:  He is interested in learning more about the risks noted above. I will request an infectious disease consultation. Even with Evusheld however I am very reluctant to proceed with Rituxmab. He will also need follow up of his HBV serology, which may be positive due to IVIg, and either HBV prophylaxis or close laboratory monitoring for hepatitis.      Lukas" ELIZABETH Hernandez      31 minutes spent on the date of the encounter on chart review, history and examination, documentation and further activities as noted above.            Again, thank you for allowing me to participate in the care of your patient.        Sincerely,        Lukas Hernandez MD

## 2022-05-09 NOTE — PROGRESS NOTES
"Return visit for 67 year old man with MMN. He reports he is developing task-specific lower limb weakness, affecting ability to crouch or walk, requiring higher doses of IVIg. Today, after a \"boost\" and in mid-cycle, he is doing well.       Neuropathy Assessments  Neurology Assessments 5/9/2022 4/22/2022 6/22/2021 5/10/2021 8/25/2020 6/23/2020   RODS MMN Score 50 50 50 50 50 50   ONLS Overall Score: 2 - - - - -      Strength: 5/9/2022 6/22/2021   Right hand strength in lbs: 81 81   Left hand strength in lbs: 85 92     Immunotherapy 5/9/2022 6/22/2021   Time since last IVIG (days): 8 9 days   Current treatment: 100 gms IVIg every 2 weeks 90 gms IVIg q2wks         Motor examination:     Right Left   Shoulder abduction  5 5   Elbow extension 5 5   Elbow flexion 5 5   Wrist extension  5 5   Finger extension 5 5   FDI 5 5   APB 5 5   Hip flexion 5 5   Knee flexion 5 5   Knee extension 5 5   Dorsiflexion 5 3   Plantar flexion 5 5   A=atrophy    Tone normal     Gait:  Steppage.      Impression:  Chilo has responded well to Rituximab in the past, measured as a reduction in his IVIg dependency. However, he has not received the CoVID vaccine. I have substantial concerns about an elective course of Rituximab in this setting and expressed this to him. Alternative is to continue with current IVIg dosing.     Recommendations:  He is interested in learning more about the risks noted above. I will request an infectious disease consultation. Even with Evusheld however I am very reluctant to proceed with Rituxmab. He will also need follow up of his HBV serology, which may be positive due to IVIg, and either HBV prophylaxis or close laboratory monitoring for hepatitis.      Lukas Hernandez M.D.      31 minutes spent on the date of the encounter on chart review, history and examination, documentation and further activities as noted above.        "

## 2022-05-10 ENCOUNTER — TELEPHONE (OUTPATIENT)
Dept: NEUROLOGY | Facility: CLINIC | Age: 67
End: 2022-05-10
Payer: COMMERCIAL

## 2022-05-10 NOTE — TELEPHONE ENCOUNTER
5/10 per rj Peters note ( see below) I called and talked to patient, he said he didn't know anything about Infectious Disease. I stated that it was on his after visit summary. Patient asked if I could send the number to him through My Chart. I sent him [phone].      [facility]   Neurology & Neurosurgery Specialties  [facility]   [phone]            Rj Peters RN ROFF, DENISE  Could you please follow up with the pt to make sure he gets an appt with ID?     Rj Peters RN   Neurology Care Coordinator

## 2022-05-16 NOTE — TELEPHONE ENCOUNTER
RECORDS RECEIVED FROM: Internal   DATE RECEIVED: 06.28.2022   NOTES (Gather within 2 years) STATUS DETAILS   OFFICE NOTE from referring provider   Internal 05.09.2022 Lukas Hernandez MD   OFFICE NOTE from other specialist Internal  04.13.2022 Joao Cage     DISCHARGE SUMMARY from hospital     DISCHARGE REPORT from the ER     LABS (any labs) Internal    MEDICATION LIST Internal    IMAGING  (NEED IMAGES AND REPORTS)     Osteomyelitis: Foot imaging      Liver Abscess: Abdominal imaging     Other (anything related to diagnoses

## 2022-06-28 ENCOUNTER — VIRTUAL VISIT (OUTPATIENT)
Dept: INFECTIOUS DISEASES | Facility: CLINIC | Age: 67
End: 2022-06-28
Attending: PSYCHIATRY & NEUROLOGY
Payer: COMMERCIAL

## 2022-06-28 ENCOUNTER — PRE VISIT (OUTPATIENT)
Dept: INFECTIOUS DISEASES | Facility: CLINIC | Age: 67
End: 2022-06-28

## 2022-06-28 DIAGNOSIS — G61.82 MMN (MULTIFOCAL MOTOR NEUROPATHY) (H): ICD-10-CM

## 2022-06-28 PROCEDURE — 99205 OFFICE O/P NEW HI 60 MIN: CPT | Mod: 95 | Performed by: INTERNAL MEDICINE

## 2022-06-28 PROCEDURE — G0463 HOSPITAL OUTPT CLINIC VISIT: HCPCS | Mod: PN,RTG | Performed by: INTERNAL MEDICINE

## 2022-06-28 NOTE — NURSING NOTE
Patient states they are in [place] for today's virtual visit.     Yazmin Bassett, Virtual Visit Fasilitator

## 2022-06-28 NOTE — PROGRESS NOTES
"Black is a 67 year old who is being evaluated via a billable video visit.      How would you like to obtain your AVS? Abbey  If the video visit is dropped, the invitation should be resent by: Text to cell phone: [phone]  Will anyone else be joining your video visit? No        Video-Visit Details    Video Start Time:  7 am    Type of service:  Video Visit    Video End Time: 8 am    Originating Location (pt. Location): Home    Distant Location (provider location):  [facility] INFECTIOUS DISEASE CLINIC [place]     Platform used for Video Visit: Jesus Cleaning is a 67 year old who is being evaluated via a billable video visit.      How would you like to obtain your AVS? Abbey        Jenifer Cleaning is a 67 year old, presenting for the following health issues: multi-focal motor neuropathy (MMN)  Video Visit (Consultation for rituximab for MMN )      HPI   The patient states that his neurologic condition MMN causes his muscles to not operate correctly. He has a foot drop which is chronic. He is getting every two weeks IVIG infusions at home. This helps control his weakness and he can live a good quality of life and stay active. He was treated once in the past with Rituxan and he was hoping to get another dose of this to \"knock down\" his MMN. Otherwise he is concerned he may need to increase the dose of his IVIG infusions which is does not want to do. Dr. Hernandez has referred for an ID consultation to discuss the possibility of getting the COVID_19 vaccine before the patient gets Rituxan. The patient has not been vaccinated for COVID-19 yet because he is concerned about potential side effects and also because it has become very political. He says that he is not usually a non-vaxer but is very hesitant to get the COVID-19. He states he has already tested positive for antibodies to COVID-19. 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 He is currently feeling well except for the foot drop.       Review of Systems   Constitutional, HEENT, cardiovascular, pulmonary, GI, , musculoskeletal, neuro, skin, endocrine and psych systems are negative, except as otherwise noted.    No past medical history on file.   Multifocal Motor Neuropathy    Past Surgical History:   Procedure Laterality Date    RAFA PROCEDURE N/A 4/16/2019    Procedure: Penile Plication;  Surgeon: Dmitriy Wiley MD;  Location:  OR     Social History     Social History Narrative    Not on file   [employer] store .      Objective    Vitals - Patient Reported  Weight (Patient Reported): 68 kg (150 lb)  Pain Score: No Pain (0)        Physical Exam   GENERAL: Healthy, alert and no distress  EYES: Eyes grossly normal to inspection.  No discharge or erythema, or obvious scleral/conjunctival abnormalities.  RESP: No audible wheeze, cough, or visible cyanosis.  No visible retractions or increased work of breathing.    SKIN: Visible skin clear. No significant rash, abnormal pigmentation or lesions.  NEURO: Cranial nerves grossly intact.  Mentation and speech appropriate for age.  PSYCH: Mentation appears normal, affect normal/bright, judgement and insight intact, normal speech and appearance well-groomed.    Office Visit on 02/18/2020   Component Date Value Ref Range Status    Sodium 02/18/2020 135  133 - 144 mmol/L Final    Potassium 02/18/2020 4.4  3.4 - 5.3 mmol/L Final    Chloride 02/18/2020 101  94 - 109 mmol/L Final    Carbon Dioxide 02/18/2020 30  20 - 32 mmol/L Final    Anion Gap 02/18/2020 4  3 - 14 mmol/L Final    Glucose 02/18/2020 110 (A) 70 - 99 mg/dL Final    Urea Nitrogen 02/18/2020 14  7 - 30 mg/dL Final    Creatinine 02/18/2020 0.83  0.66 - 1.25 mg/dL Final    GFR Estimate 02/18/2020 >90  >60 mL/min/[1.73_m2] Final    Comment: Non  GFR Calc  Starting 12/18/2018, serum creatinine based estimated GFR (eGFR) will be   calculated  using the Chronic Kidney Disease Epidemiology Collaboration   (CKD-EPI) equation.      GFR Estimate If Black 02/18/2020 >90  >60 mL/min/[1.73_m2] Final    Comment:  GFR Calc  Starting 12/18/2018, serum creatinine based estimated GFR (eGFR) will be   calculated using the Chronic Kidney Disease Epidemiology Collaboration   (CKD-EPI) equation.      Calcium 02/18/2020 9.8  8.5 - 10.1 mg/dL Final    Bilirubin Total 02/18/2020 0.4  0.2 - 1.3 mg/dL Final    Albumin 02/18/2020 3.7  3.4 - 5.0 g/dL Final    Protein Total 02/18/2020 9.6 (A) 6.8 - 8.8 g/dL Final    Alkaline Phosphatase 02/18/2020 48  40 - 150 U/L Final    ALT 02/18/2020 40  0 - 70 U/L Final    AST 02/18/2020 36  0 - 45 U/L Final    WBC 02/18/2020 5.1  4.0 - 11.0 10e9/L Final    RBC Count 02/18/2020 4.62  4.4 - 5.9 10e12/L Final    Hemoglobin 02/18/2020 14.2  13.3 - 17.7 g/dL Final    Hematocrit 02/18/2020 41.9  40.0 - 53.0 % Final    MCV 02/18/2020 91  78 - 100 fl Final    MCH 02/18/2020 30.7  26.5 - 33.0 pg Final    MCHC 02/18/2020 33.9  31.5 - 36.5 g/dL Final    RDW 02/18/2020 13.1  10.0 - 15.0 % Final    Platelet Count 02/18/2020 332  150 - 450 10e9/L Final    % Neutrophils 02/18/2020 51.5  % Final    % Lymphocytes 02/18/2020 36.5  % Final    % Monocytes 02/18/2020 10.0  % Final    % Eosinophils 02/18/2020 1.2  % Final    % Basophils 02/18/2020 0.6  % Final    % Immature Granulocytes 02/18/2020 0.2  % Final    Absolute Neutrophil 02/18/2020 2.6  1.6 - 8.3 10e9/L Final    Absolute Lymphocytes 02/18/2020 1.9  0.8 - 5.3 10e9/L Final    Absolute Monocytes 02/18/2020 0.5  0.0 - 1.3 10e9/L Final    Absolute Eosinophils 02/18/2020 0.1  0.0 - 0.7 10e9/L Final    Absolute Basophils 02/18/2020 0.0  0.0 - 0.2 10e9/L Final    Abs Immature Granulocytes 02/18/2020 0.0  0 - 0.4 10e9/L Final    Diff Method 02/18/2020 Automated Method   Final    IGG 02/18/2020 3,012 (A) 610 - 1,616 mg/dL Final    IGA 02/18/2020 468  84 - 499 mg/dL Final    IGM 02/18/2020 85   35 - 242 mg/dL Final    Hepatitis B Core Kendal 02/18/2020 Reactive (A) NR^Nonreactive Final    Comment: A reactive result indicates acute, chronic or past/resolved hepatitis B   infection.      Hep B Surface Agn 02/18/2020 Nonreactive  NR^Nonreactive Final    Hepatitis B Surface Antibody 02/18/2020 >1,000.00 (A) <8.00 m[IU]/mL Final    Comment: Reactive, Patient is considered to be immune to infection with hepatitis B   when the value is greater than or equal to 12.0 m[IU]/mL.           Assessment & Plan     Chilo was seen today for video visit.    Diagnoses and all orders for this visit:    MMN (multifocal motor neuropathy) (H)  -     Infectious Disease Referral    I discussed with the patient the fact that if he takes a dose of Rituxan that it is very immunosuppressive and he would not be able to respond to a COVID-19 vaccination for a prolonged time, approximately 6 months. He still does not want to get a COVID-19 vaccine at this time. I discussed with him that a potential alternative preventative treatment for COVID-19 would be get a dose of Evusheld a monoclonal antibody medication that can be used in immunocompromised patients to help prevent COVID-19. The patient states that he would be willing to get a dose of the Evusheld if his neurologist prescribes the Rituxan for his MMN.     I will contact his neurologist to discuss this treatment option of Evusheld. If Dr. Hernandez agrees with this then we would need to try and coordinate him getting a dose through his primary care clinic or the infusion center at [facility] in [place], [place].   Chloe Michelle MD    Addendum: I heard back from Dr. Hernandez. He still would like the patient to get the COVID-19 vaccination prior to receiving Rituxan. He would prefer to reserve Rituxan for further advancement of neurologic symptoms. I will hold off on ordering Evusheld at this time then and will not contact the patient's primary care MD about Evusheld at this time. I  called the patient with this information.   Chloe Michelle MD          .  ..

## 2022-06-28 NOTE — LETTER
"6/28/2022       RE: Chilo Moran  [address] [facility] [address]     Dear Colleague,    Thank you for referring your patient, Chilo Moran, to the [facility] INFECTIOUS DISEASE CLINIC [place] at [facility]. Please see a copy of my visit note below.    Black is a 67 year old who is being evaluated via a billable video visit.      How would you like to obtain your AVS? [place]  If the video visit is dropped, the invitation should be resent by: Text to cell phone: [phone]  Will anyone else be joining your video visit? No        Video-Visit Details    Video Start Time:  7 am    Type of service:  Video Visit    Video End Time: 8 am    Originating Location (pt. Location): Home    Distant Location (provider location):  [facility] INFECTIOUS DISEASE [facility]     Platform used for Video Visit: Jesus Cleaning is a 67 year old who is being evaluated via a billable video visit.      How would you like to obtain your AVS? Abbey        Subjective   Black is a 67 year old, presenting for the following health issues: multi-focal motor neuropathy (MMN)  Video Visit (Consultation for rituximab for MMN )      HPI   The patient states that his neurologic condition MMN causes his muscles to not operate correctly. He has a foot drop which is chronic. He is getting every two weeks IVIG infusions at home. This helps control his weakness and he can live a good quality of life and stay active. He was treated once in the past with Rituxan and he was hoping to get another dose of this to \"knock down\" his MMN. Otherwise he is concerned he may need to increase the dose of his IVIG infusions which is does not want to do. Dr. Hernandez has referred for an ID consultation to discuss the possibility of getting the COVID_19 vaccine before the patient gets Rituxan. The patient has not been vaccinated for COVID-19 yet because he is concerned about potential side " effects and also because it has become very political. He says that he is not usually a non-vaxer but is very hesitant to get the COVID-19. He states he has already tested positive for antibodies to COVID-19. 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 He is currently feeling well except for the foot drop.       Review of Systems   Constitutional, HEENT, cardiovascular, pulmonary, GI, , musculoskeletal, neuro, skin, endocrine and psych systems are negative, except as otherwise noted.    No past medical history on file.   Multifocal Motor Neuropathy    Past Surgical History:   Procedure Laterality Date     RAFA PROCEDURE N/A 4/16/2019    Procedure: Penile Plication;  Surgeon: Dmitriy Wiley MD;  Location:  OR     Social History     Social History Narrative     Not on file   [employer] .      Objective    Vitals - Patient Reported  Weight (Patient Reported): 68 kg (150 lb)  Pain Score: No Pain (0)        Physical Exam   GENERAL: Healthy, alert and no distress  EYES: Eyes grossly normal to inspection.  No discharge or erythema, or obvious scleral/conjunctival abnormalities.  RESP: No audible wheeze, cough, or visible cyanosis.  No visible retractions or increased work of breathing.    SKIN: Visible skin clear. No significant rash, abnormal pigmentation or lesions.  NEURO: Cranial nerves grossly intact.  Mentation and speech appropriate for age.  PSYCH: Mentation appears normal, affect normal/bright, judgement and insight intact, normal speech and appearance well-groomed.    Office Visit on 02/18/2020   Component Date Value Ref Range Status     Sodium 02/18/2020 135  133 - 144 mmol/L Final     Potassium 02/18/2020 4.4  3.4 - 5.3 mmol/L Final     Chloride 02/18/2020 101  94 - 109 mmol/L Final     Carbon Dioxide 02/18/2020 30  20 - 32 mmol/L Final     Anion Gap 02/18/2020 4  3 - 14 mmol/L Final     Glucose 02/18/2020  110 (A) 70 - 99 mg/dL Final     Urea Nitrogen 02/18/2020 14  7 - 30 mg/dL Final     Creatinine 02/18/2020 0.83  0.66 - 1.25 mg/dL Final     GFR Estimate 02/18/2020 >90  >60 mL/min/[1.73_m2] Final    Comment: Non  GFR Calc  Starting 12/18/2018, serum creatinine based estimated GFR (eGFR) will be   calculated using the Chronic Kidney Disease Epidemiology Collaboration   (CKD-EPI) equation.       GFR Estimate If Black 02/18/2020 >90  >60 mL/min/[1.73_m2] Final    Comment:  GFR Calc  Starting 12/18/2018, serum creatinine based estimated GFR (eGFR) will be   calculated using the Chronic Kidney Disease Epidemiology Collaboration   (CKD-EPI) equation.       Calcium 02/18/2020 9.8  8.5 - 10.1 mg/dL Final     Bilirubin Total 02/18/2020 0.4  0.2 - 1.3 mg/dL Final     Albumin 02/18/2020 3.7  3.4 - 5.0 g/dL Final     Protein Total 02/18/2020 9.6 (A) 6.8 - 8.8 g/dL Final     Alkaline Phosphatase 02/18/2020 48  40 - 150 U/L Final     ALT 02/18/2020 40  0 - 70 U/L Final     AST 02/18/2020 36  0 - 45 U/L Final     WBC 02/18/2020 5.1  4.0 - 11.0 10e9/L Final     RBC Count 02/18/2020 4.62  4.4 - 5.9 10e12/L Final     Hemoglobin 02/18/2020 14.2  13.3 - 17.7 g/dL Final     Hematocrit 02/18/2020 41.9  40.0 - 53.0 % Final     MCV 02/18/2020 91  78 - 100 fl Final     MCH 02/18/2020 30.7  26.5 - 33.0 pg Final     MCHC 02/18/2020 33.9  31.5 - 36.5 g/dL Final     RDW 02/18/2020 13.1  10.0 - 15.0 % Final     Platelet Count 02/18/2020 332  150 - 450 10e9/L Final     % Neutrophils 02/18/2020 51.5  % Final     % Lymphocytes 02/18/2020 36.5  % Final     % Monocytes 02/18/2020 10.0  % Final     % Eosinophils 02/18/2020 1.2  % Final     % Basophils 02/18/2020 0.6  % Final     % Immature Granulocytes 02/18/2020 0.2  % Final     Absolute Neutrophil 02/18/2020 2.6  1.6 - 8.3 10e9/L Final     Absolute Lymphocytes 02/18/2020 1.9  0.8 - 5.3 10e9/L Final     Absolute Monocytes 02/18/2020 0.5  0.0 - 1.3 10e9/L Final      Absolute Eosinophils 02/18/2020 0.1  0.0 - 0.7 10e9/L Final     Absolute Basophils 02/18/2020 0.0  0.0 - 0.2 10e9/L Final     Abs Immature Granulocytes 02/18/2020 0.0  0 - 0.4 10e9/L Final     Diff Method 02/18/2020 Automated Method   Final     IGG 02/18/2020 3,012 (A) 610 - 1,616 mg/dL Final     IGA 02/18/2020 468  84 - 499 mg/dL Final     IGM 02/18/2020 85  35 - 242 mg/dL Final     Hepatitis B Core Kendal 02/18/2020 Reactive (A) NR^Nonreactive Final    Comment: A reactive result indicates acute, chronic or past/resolved hepatitis B   infection.       Hep B Surface Agn 02/18/2020 Nonreactive  NR^Nonreactive Final     Hepatitis B Surface Antibody 02/18/2020 >1,000.00 (A) <8.00 m[IU]/mL Final    Comment: Reactive, Patient is considered to be immune to infection with hepatitis B   when the value is greater than or equal to 12.0 m[IU]/mL.           Assessment & Plan     Chilo was seen today for video visit.    Diagnoses and all orders for this visit:    MMN (multifocal motor neuropathy) (H)  -     Infectious Disease Referral    I discussed with the patient the fact that if he takes a dose of Rituxan that it is very immunosuppressive and he would not be able to respond to a COVID-19 vaccination for a prolonged time, approximately 6 months. He still does not want to get a COVID-19 vaccine at this time. I discussed with him that a potential alternative preventative treatment for COVID-19 would be get a dose of Evusheld a monoclonal antibody medication that can be used in immunocompromised patients to help prevent COVID-19. The patient states that he would be willing to get a dose of the Evusheld if his neurologist prescribes the Rituxan for his MMN.     I will contact his neurologist to discuss this treatment option of Evusheld. If Dr. Hernandez agrees with this then we would need to try and coordinate him getting a dose through his primary care clinic or the infusion center at [facility] in [place], [place].   Chloe  MD Viviana    Addendum: I heard back from Dr. Hernandez. He still would like the patient to get the COVID-19 vaccination prior to receiving Rituxan. He would prefer to reserve Rituxan for further advancement of neurologic symptoms. I will hold off on ordering Evusheld at this time then and will not contact the patient's primary care MD about Evusheld at this time. I called the patient with this information.   Chloe Michelle MD

## 2022-08-11 ENCOUNTER — MEDICAL CORRESPONDENCE (OUTPATIENT)
Dept: HEALTH INFORMATION MANAGEMENT | Facility: CLINIC | Age: 67
End: 2022-08-11

## 2022-08-11 ENCOUNTER — TELEPHONE (OUTPATIENT)
Dept: NEUROLOGY | Facility: CLINIC | Age: 67
End: 2022-08-11

## 2022-08-11 NOTE — TELEPHONE ENCOUNTER
[facility] Call Center    Phone Message    May a detailed message be left on voicemail: yes     Reason for Call: Other: Leodan from pharm is requesting a new prescription for immune globulin - sucrose free 10 % injection he said pt is asking for inj / pharm can take verbal over the phone be sure to dial [phone] option 2 then option 2 again to get a pharmacist     Action Taken: Other: ortho- [facility]    Travel Screening: Not Applicable

## 2022-08-30 ENCOUNTER — VIRTUAL VISIT (OUTPATIENT)
Dept: NEUROLOGY | Facility: CLINIC | Age: 67
End: 2022-08-30
Payer: COMMERCIAL

## 2022-08-30 DIAGNOSIS — G61.82 MMN (MULTIFOCAL MOTOR NEUROPATHY) (H): Primary | ICD-10-CM

## 2022-08-30 PROCEDURE — 99212 OFFICE O/P EST SF 10 MIN: CPT | Mod: 95 | Performed by: PSYCHIATRY & NEUROLOGY

## 2022-08-30 NOTE — PROGRESS NOTES
Visit Date: 2022    Dear Dr. Cage:    I saw Chilo Moran again in followup via telehealth for his multifocal motor neuropathy.  Since his most recent boost, Mr. Moran feels he is doing quite well.  His EVARISTO-MMN score is 50, which is normal.  I explained that I am reluctant to prescribe rituximab in an individual who is not vaccinated for COVID.  Should there be unequivocal deterioration or substantial loss of response to treatment mandating a trial of rituximab, I would at minimum recommend Evusheld, but if he is doing well, as he is currently, I would not make any changes.  He will return in the spring for a followup in-person visit.    Lukas Hernandez MD    13 minutes spent on the date of the encounter on chart review, history and examination, documentation and further activities as noted above.    D: 2022   T: 2022   MT: KELBY    Name:     CHILO MORAN  MRN:      [id number]        Account:    [id number]   :      1955           Visit Date: 2022     Document: [id number]

## 2022-08-30 NOTE — PATIENT INSTRUCTIONS
No change in infusion schedule.  Virtual follow up in 3 months.  In-person follow up in spring 2023.

## 2022-08-30 NOTE — PROGRESS NOTES
Black is a 67 year old who is being evaluated via a billable video visit.      How would you like to obtain your AVS? MyChart  If the video visit is dropped, the invitation should be resent by: Text to cell phone: [phone]  Will anyone else be joining your video visit? No        Video-Visit Details    Video Start Time:   Type of service:  Video Visit    Video End Time:    Originating Location (pt. Location):     Distant Location (provider location):  [facility] NEUROLOGY CLINIC [place]     Platform used for Video Visit:   WILMAN Hubbard, KAYLA ([facility])

## 2022-08-30 NOTE — LETTER
2022         RE: Chilo Moran  [address] [facility] [address]        Dear Colleague,    Thank you for referring your patient, Chilo Moran, to the [facility] NEUROLOGY CLINIC [place]. Please see a copy of my visit note below.    Black is a 67 year old who is being evaluated via a billable video visit.      How would you like to obtain your AVS? MyChart  If the video visit is dropped, the invitation should be resent by: Text to cell phone: [phone]  Will anyone else be joining your video visit? No        Video-Visit Details    Video Start Time:   Type of service:  Video Visit    Video End Time:    Originating Location (pt. Location):     Distant Location (provider location):  [facility] NEUROLOGY [facility]     Platform used for Video Visit:   WILMAN Hubbard CMA ([facility])        Visit Date: 2022    Dear Dr. Cage:    I saw Chilo Moran again in followup via telehealth for his multifocal motor neuropathy.  Since his most recent boost, Mr. Moran feels he is doing quite well.  His EVARISTO-MMN score is 50, which is normal.  I explained that I am reluctant to prescribe rituximab in an individual who is not vaccinated for COVID.  Should there be unequivocal deterioration or substantial loss of response to treatment mandating a trial of rituximab, I would at minimum recommend Ladyusheld, but if he is doing well, as he is currently, I would not make any changes.  He will return in the spring for a followup in-person visit.    Lukas Hernandez MD    13 minutes spent on the date of the encounter on chart review, history and examination, documentation and further activities as noted above.    D: 2022   T: 2022   MT: KELBY    Name:     CHILO MORAN  MRN:      [id number]        Account:    [id number]   :      1955           Visit Date: 2022     Document: [id number]        Again, thank you for allowing me to participate in the care of your patient.         Sincerely,        Lukas Hernandez MD

## 2022-10-28 ENCOUNTER — TELEPHONE (OUTPATIENT)
Dept: NEUROLOGY | Facility: CLINIC | Age: 67
End: 2022-10-28

## 2022-10-28 NOTE — TELEPHONE ENCOUNTER
Last three office visit notes have been faxed to [facility] as requested. Confirmation of successful delivery was received.    Micheline Peters RN Care Coordinator   Neurology/Neurosurgery/PM&R/ Pain Management

## 2022-10-28 NOTE — TELEPHONE ENCOUNTER
Lucina from calling [facility] pharmacy calling, it is time to renewal PA for immune globulin - sucrose free 10 % injection. They are requesting last clinical office notes to be faxed to [phone].      WALLY Powell  Rheumatology/Infectious disease  [facility]   [phone]

## 2022-12-13 ENCOUNTER — MYC MEDICAL ADVICE (OUTPATIENT)
Dept: NEUROLOGY | Facility: CLINIC | Age: 67
End: 2022-12-13

## 2022-12-13 NOTE — CONFIDENTIAL NOTE
"RN called the pt to discuss his concerns regarding his symptoms. He mentions that on the day of his last two IVIg infusions (11/27 and 12/11) he has noticed a very slight increase in leg weakness which lasts about 2-3 days post infusion and then resolves. He states historically, he has waited too long to reach out and is left with far more debilitating symptoms and would like to \"stay ahead of the game this time\". I brought up the fact that we should do an updated RODS and ONLS but he mentions that his score will not be much different than the last. He has noticed that it is slightly more difficult to get up out of a chair and same with squatting down, he finds it takes more energy to stand back up. Walking stairs has been fine, no new concerns about that.     He self infuses 100g once every 2 weeks.     Routed to Dr Hernandez to review and advise.    Micheline Peters RN Care Coordinator   Neurology/Neurosurgery/PM&R/ Pain Management     "

## 2022-12-13 NOTE — CONFIDENTIAL NOTE
RN called the pt to let him know about Dr Hernandez's suggestion to increase his dose to 110g once every 2 weeks. He mentioned that his equipment can only hold 100g at a time and he would need a whole new shoulder bag because the current one barely fits. After discussing this with [facility] and Dr Hernandez and we decided to proceed with giving him 30g once for 2 occurences on alternate weekends as his regular dose. His last 100g was infused on 12/11 so his infusion schedule for the next 4 weeks would be:    12/18/22: 30g IVIg  12/25/22: 100g IVIg  1/1/23: 30g   1/8/23: 100g    We would then reassess after his infusion on 1/8/23. Will send a delayed reminder to follow up with the pt. Encounter routed to Dr Hernandez to review the pending therapy plan and sign.     Micheline Peters RN Care Coordinator   Neurology/Neurosurgery/PM&R/ Pain Management

## 2022-12-13 NOTE — TELEPHONE ENCOUNTER
Increase to 110 gms every 2 weeks and re-convene with a nurse visit in 4-6- weeks, sooner if worse.

## 2023-01-07 ENCOUNTER — HEALTH MAINTENANCE LETTER (OUTPATIENT)
Age: 68
End: 2023-01-07

## 2023-01-13 NOTE — MR AVS SNAPSHOT
After Visit Summary   12/12/2017    Chilo Moran    MRN: [id number]           Patient Information     Date Of Birth          1955        Visit Information        Provider Department      12/12/2017 9:50 AM Renetta Clarke APRN CNP [facility]        Today's Diagnoses     Elevated blood pressure reading without diagnosis of hypertension    -  1    Screening for thyroid disorder          Care Instructions    PLAN:   1.  Orders Placed This Encounter   Procedures     Basic metabolic panel     TSH with free T4 reflex       2. Patient needs to follow up in if no improvement,or sooner if worsening of symptoms or other symptoms develop.  Will follow up and/or notify patient of  results via My Chart to determine further need for followup  Please make follow up appointment with regular provider and recheck BP there  May need to consider a 24 hour BP monitor as Blood pressure at visits at home office have all been in normal range.     It was a pleasure seeing you today at the [facility]. Thank you for allowing us to care for you today. We truly hope we provided you with the excellent service you deserve. Please let us know if there is anything else we can do for you so we can be sure you are leaving completley satisfied with your care experience.       General information about your clinic   Clinic Hours Lab Hours (Appointments are required)   Mon-Thurs: 7:30 AM - 7 PM Mon-Thurs: 7:30 AM - 7 PM   Fri: 7:30 AM - 5 PM Fri: 7:30 AM - 5 PM        After Hours Nurse Advise & Appts:  [facility]: [phone]  [facility] On Call: to make appointments anytime: [phone] On Call Physician: call [phone] and answering service will page the on call physician.        For urgent appointments, please call [phone] and ask for the triage nurse or your care team clinic nurse.  How to contact my care team:  MyChart: [email or web address]  Will route to nurse pool. Please advise.     Phone: [phone]   Fax: [phone]       [facility]:   Phone: [phone]  Hours: 8:00 AM - 6:00 PM  Medication Refills:  Call your pharmacy and they will forward the refill to us. Please allow 3 business days for your refills to be completed.       Normal or non-critical lab and imaging results will be communicated to you by MyChart, letter or phone within 7 days.  If you do not hear from us within 10 days, please call the clinic. If you have a critical or abnormal lab result, we will notify you by phone as soon as possible.       We now have PWIC (Pediatric Walk in Care)  Monday-Friday from 7:30-4. Simply walk in and be seen for your urgent needs like cough, fever, rash, diarrhea or vomiting, pink eye, UTI. No appointments needed. Ask one of the team for more information      -Your Care Team:    Dr. Elham Pantoja - Internal Medicine/Pediatrics   Dr. Rayo Castañeda - Family Medicine  Dr. Sonia Grant - Pediatrics  Dr. Kelley Miller - Pediatrics  Renetta Clarke CNP - Family Practice Nurse Practitioner                           Follow-ups after your visit        Your next 10 appointments already scheduled     Jun 12, 2018  8:00 AM CDT   Return Visit with Lukas Hernandez MD   [facility] ([facility])    [address] [place] [id number] [phone]              Who to contact     If you have questions or need follow up information about today's clinic visit or your schedule please contact [facility] directly at [phone].  Normal or non-critical lab and imaging results will be communicated to you by MyChart, letter or phone within 4 business days after the clinic has received the results. If you do not hear from us within 7 days, please contact the clinic through MyChart or phone. If you have a critical or abnormal lab result, we will notify you by phone as soon as possible.  Submit refill requests through [facility] or call your pharmacy and  "they will forward the refill request to us. Please allow 3 business days for your refill to be completed.          Additional Information About Your Visit        [employer] Information     [employer] gives you secure access to your electronic health record. If you see a primary care provider, you can also send messages to your care team and make appointments. If you have questions, please call your primary care clinic.  If you do not have a primary care provider, please call [phone] and they will assist you.      [employer] is an electronic gateway that provides easy, online access to your medical records. With [employer], you can request a clinic appointment, read your test results, renew a prescription or communicate with your care team.     To access your existing account, please contact your [facility] or call [phone] for assistance.        Care EveryWhere ID     This is your Care EveryWhere ID. This could be used by other organizations to access your [place] medical records  [id number]        Your Vitals Were     Pulse Temperature Height Pulse Oximetry BMI (Body Mass Index)       73 97.4  F (36.3  C) (Temporal) 5' 10\" (1.778 m) 97% 23.68 kg/m2        Blood Pressure from Last 3 Encounters:   12/12/17 (!) 162/100   12/12/17 (!) 170/108   12/13/16 (!) 146/102    Weight from Last 3 Encounters:   12/12/17 165 lb (74.8 kg)   12/12/17 165 lb (74.8 kg)   12/13/16 165 lb 3.2 oz (74.9 kg)              We Performed the Following     Basic metabolic panel     TSH with free T4 reflex          Where to get your medicines      Some of these will need a paper prescription and others can be bought over the counter.  Ask your nurse if you have questions.     Bring a paper prescription for each of these medications     immune globulin - sucrose free 10 % injection          Primary Care Provider Office Phone # Fax #    Joao Cage [phone]       [address]  [facility] [address]        Equal Access to Services     CJ KAHN : Hadii aad ku hadalonsokevin Robertaali, waivettda jonathonleslyha, moniquedale jacobojustinmarcelina urbano, luz dempseyagatabyron cassidy. So [facility] [phone].    ATENCIÓN: Si habla español, tiene a hyman disposición servicios gratuitos de asistencia lingüística. Llame al [phone].    We comply with applicable federal civil rights laws and [place] laws. We do not discriminate on the basis of race, color, national origin, age, disability, sex, sexual orientation, or gender identity.            Thank you!     Thank you for choosing [facility]  for your care. Our goal is always to provide you with excellent care. Hearing back from our patients is one way we can continue to improve our services. Please take a few minutes to complete the written survey that you may receive in the mail after your visit with us. Thank you!             Your Updated Medication List - Protect others around you: Learn how to safely use, store and throw away your medicines at www.disposemymeds.org.          This list is accurate as of: 12/12/17 10:12 AM.  Always use your most recent med list.                   Brand Name Dispense Instructions for use Diagnosis    ascorbic acid 500 MG tablet    VITAMIN C     Take 500 mg by mouth daily        atorvastatin 40 MG tablet    LIPITOR          immune globulin - sucrose free 10 % injection     202.44 mL    Inject 60 g into the vein every 14 days Infuse IVIg (Privigen) 60g intravenously via peripheral IV one time every two weeks. This is ordered through [facility].    MMN (multifocal motor neuropathy) (H)       MULTIPLE VITAMIN PO      Take by mouth daily

## 2023-02-28 ENCOUNTER — VIRTUAL VISIT (OUTPATIENT)
Dept: NEUROLOGY | Facility: CLINIC | Age: 68
End: 2023-02-28
Payer: COMMERCIAL

## 2023-02-28 DIAGNOSIS — G61.82 MMN (MULTIFOCAL MOTOR NEUROPATHY) (H): Primary | ICD-10-CM

## 2023-02-28 PROCEDURE — 99213 OFFICE O/P EST LOW 20 MIN: CPT | Mod: VID | Performed by: PSYCHIATRY & NEUROLOGY

## 2023-02-28 NOTE — PROGRESS NOTES
Chilo Moran's goals for this visit include:   Chief Complaint   Patient presents with     RECHECK     MMN / 3 month follow up        He requests these members of his care team be copied on today's visit information: yes    PCP: Joao Cage    Referring Provider:  No referring provider defined for this encounter.    There were no vitals taken for this visit.    Do you need any medication refills at today's visit? No  WILMAN Hubbard, KAYLA ([facility])

## 2023-02-28 NOTE — LETTER
2/28/2023         RE: Chilo Moran  [address] [place] [id number]        Dear Colleague,    Thank you for referring your patient, Chilo Moran, to the [facility] [place]. Please see a copy of my visit note below.    Chilo Moran's goals for this visit include:   Chief Complaint   Patient presents with     RECHECK     MMN / 3 month follow up        He requests these members of his care team be copied on today's visit information: yes    PCP: Joao Cage    Referring Provider:  No referring provider defined for this encounter.    There were no vitals taken for this visit.    Do you need any medication refills at today's visit? No  WILMAN Hubbard, [facility] ([facility])        Visit Date: 02/28/2023    Joao Cage MD  [address] [address]    Dear Dr. Cage:    I saw Chilo Baptiste in followup via a telehealth visit today.  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 Not long ago, he observed a functionally significant deterioration with weakness of proximal lower limbs. After a small additional dose, he returned to normal and feels he is doing quite well.  He currently has no functional deficits.  Examination was performed via telehealth only and is limited.  Nonetheless, there is full functional use of all limbs. Despite his known antigravity ankle dorsiflexion and his inability to walk on the heel of the left foot, redemonstrated via video today, his gait is normal without evidence of steppage and he is able to squat and stand from a squatting position with no difficulty.    I will make no changes in Mr. Baptiste' IVIG regimen today; specifically, I would not taper further in the context of a recent exacerbation and a virtual visit only.  We again discussed the option of rituximab.  This was associated with a reduction in IVIG dependence when used previously.  I did not want to use it during the pandemic as he is not vaccinated and continues  to choose not to have COVID vaccination.  Now that the prevalence of COVID-19 is dramatically lower, I will readdress this with our infectious disease consultant.  In addition, there is a current clinical trial for which he may be a candidate.  He expressed interest in this and provided permission for me to have our research coordinator reach out to him.  He will return in 6 months regardless.    Lukas Hernandez MD    20 minutes spent on the date of the encounter on chart review, history and examination, documentation and further activities as noted above.    D: 2023   T: 2023   MT: PIO    Name:     KAYLIN WELLS  MRN:      [id number]        Account:    [id number]   :      1955           Visit Date: 2023     Document: [id number]        Again, thank you for allowing me to participate in the care of your patient.        Sincerely,        Lukas Hernandez MD

## 2023-02-28 NOTE — PROGRESS NOTES
Visit Date: 2023    [place] [address]    Dear Dr. Cage:    I saw Chilo Baptiste in followup via a telehealth visit today.  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 Not long ago, he observed a functionally significant deterioration with weakness of proximal lower limbs. After a small additional dose, he returned to normal and feels he is doing quite well.  He currently has no functional deficits.  Examination was performed via telehealth only and is limited.  Nonetheless, there is full functional use of all limbs. Despite his known antigravity ankle dorsiflexion and his inability to walk on the heel of the left foot, redemonstrated via video today, his gait is normal without evidence of steppage and he is able to squat and stand from a squatting position with no difficulty.    I will make no changes in Mr. Baptiste' IVIG regimen today; specifically, I would not taper further in the context of a recent exacerbation and a virtual visit only.  We again discussed the option of rituximab.  This was associated with a reduction in IVIG dependence when used previously.  I did not want to use it during the pandemic as he is not vaccinated and continues to choose not to have COVID vaccination.  Now that the prevalence of COVID-19 is dramatically lower, I will readdress this with our infectious disease consultant.  In addition, there is a current clinical trial for which he may be a candidate.  He expressed interest in this and provided permission for me to have our research coordinator reach out to him.  He will return in 6 months regardless.    Lukas Hernandez MD    20 minutes spent on the date of the encounter on chart review, history and examination, documentation and further activities as noted above.    D: 2023   T: 2023   MT: PIO    Name:     CHILO WELLS  MRN:      [id number]        Account:    [id number]   :      1955            Visit Date: 02/28/2023     Document: [id number]

## 2023-03-09 ENCOUNTER — TELEPHONE (OUTPATIENT)
Dept: NEUROLOGY | Facility: CLINIC | Age: 68
End: 2023-03-09

## 2023-03-09 NOTE — TELEPHONE ENCOUNTER
Pt informed of provider's response below and informed that he will need to discuss with Dr. Hernandez again before moving forward with rituximab. He has been scheduled for a 6 month in-person visit at  on 8/22/23 at 9am. He has not seen provider in-person since May 2022 and is OK with in-person for now. If virtual is needed, then he may call back to have this changed.       From: Lukas Hernandez MD   Sent: 3/9/2023   8:17 AM CST   To: [facility] [place]   Subject: follow up                                         Please call Chilo with the following information:     1. Infectious disease indicated that they are not using monoclonal Ab treatment at this time to protect against COVID. I would want a follow up visit with them (could likely be virtual) before using rituximab.     2. Our research nurse will be contacting him about the clinical trial we discussed.     Thanks!        Gina Garcia, RNCC  Neurology/Neurosurgery

## 2023-06-02 ENCOUNTER — HEALTH MAINTENANCE LETTER (OUTPATIENT)
Age: 68
End: 2023-06-02

## 2023-08-09 ENCOUNTER — TELEPHONE (OUTPATIENT)
Dept: NEUROLOGY | Facility: CLINIC | Age: 68
End: 2023-08-09
Payer: COMMERCIAL

## 2023-08-09 NOTE — TELEPHONE ENCOUNTER
Writer faxed signed orders ( two sets)  to [facility] at Dr Aviles request.  Verified delivery by right fax.  Paperwork sen tot HIMs for scan.  TYREL Hubbard., KAYLA ([facility])

## 2023-08-11 NOTE — PROGRESS NOTES
2020      Joao Cage MD   [address] [id number]      PATIENT:  Chilo Moran   MRN:  [id number]   :  1955      Dear Dr. Cage:      I met with Chilo Moran via telemedicine visit today for followup of his established diagnosis of multifocal motor neuropathy.  Mr. Moran reports he is doing as well as he has ever done on his current infusion regimen of 80 grams every 2 weeks.  When we dropped to 70, or briefly delayed treatment, his symptoms worsened.  His current RODS MMN score is 50, which is normal.  Examination demonstrates full use of the limbs.  He rises from a chair and walks independently, but he cannot walk on the heel of the left foot.      We will make no changes in Chilo' medication regimen currently and will not start rituximab at this time.  He will follow up by video in 3 months and I will see him annually in person, at which time I can obtain a creatinine level as well.            Sincerely,      JONE DUDLEY MD             D: 2020   T: 2020   MT: JUNIOR      Name:     CHILO MORAN   MRN:      [id number]        Account:      [id number]   :      1955      Document: [id number]       cc: Joao Cgae MD      no

## 2023-08-22 ENCOUNTER — OFFICE VISIT (OUTPATIENT)
Dept: NEUROLOGY | Facility: CLINIC | Age: 68
End: 2023-08-22
Payer: COMMERCIAL

## 2023-08-22 ENCOUNTER — TELEPHONE (OUTPATIENT)
Dept: NEUROLOGY | Facility: CLINIC | Age: 68
End: 2023-08-22

## 2023-08-22 VITALS
HEART RATE: 83 BPM | DIASTOLIC BLOOD PRESSURE: 81 MMHG | BODY MASS INDEX: 22.96 KG/M2 | WEIGHT: 160 LBS | SYSTOLIC BLOOD PRESSURE: 130 MMHG

## 2023-08-22 DIAGNOSIS — G61.82 MMN (MULTIFOCAL MOTOR NEUROPATHY) (H): Primary | ICD-10-CM

## 2023-08-22 PROCEDURE — 99214 OFFICE O/P EST MOD 30 MIN: CPT | Performed by: PSYCHIATRY & NEUROLOGY

## 2023-08-22 NOTE — TELEPHONE ENCOUNTER
Pt seen by provider today in-clinic and 50 grams IVIg boost recommended to be infused by the end of this week. Writer contacted [facility] pharmacy and TORB provided for a one-time 50g IVIg boost dose to be infused this week. Pharmacist will sung this request as urgent and have this processed ASAP. They may be able to ship supplies out by the end of today but will also depend on insurance approval. If any concerns with insurance they will contact the clinic. [facility] may send hard copy of orders to be manually signed by provider but this will not need to be signed prior to shipment of IVIg/supplies. They will reach out to pt to schedule delivery of supplies when available.      Pharmacist also requested a TORB for saline flush orders. They have requested for an additional 50ml NS flush post infusion to ensure all medications have been administered. Writer provided TORB for additional 50ml NS flush which will be provided for pt's boost dose and his scheduled 100g dose every 2 weeks.     Pt notified and will wait for communication from [facility] about shipment of supplies.       Gina Garcia, RNCC  Neurology/Neurosurgery

## 2023-08-22 NOTE — PATIENT INSTRUCTIONS
"50 gms IVIg \"boost\" now  Hand therapy referral to test pinch and  strength right hand at [facility]; ideally this week before infusion and 1 week after  Infectious disease referral regarding Rituximab  Follow up 1 PM Sept 12 for a brief re-examination  6 month in-person follow up  "

## 2023-08-22 NOTE — LETTER
"    8/22/2023         RE: Chilo Moran  [address] [facility] [address]        Dear Colleague,    Thank you for referring your patient, Chilo Moran, to the [facility] [place]. Please see a copy of my visit note below.    Return visit for 68 year old man with MMN. Feels weaker in LEs. \"Boost\" works for a while. No TRFs.     Neuropathy Assessments      8/22/2023     8:00 AM 2/28/2023     9:00 AM 8/30/2022     8:00 AM 5/9/2022     4:00 PM 5/9/2022     3:00 PM 4/22/2022     4:00 PM 6/22/2021     8:00 AM   Neurology Assessments   RODS MMN Score 50 0 50  50 50 50   ONLS Overall Score:    2            8/22/2023     9:00 AM 5/9/2022     4:29 PM 6/22/2021     8:29 AM    Strength:   Right hand strength in lbs: 68 81 81   Left hand strength in lbs: 66 85 92         8/22/2023     9:00 AM 5/9/2022     4:00 PM 6/22/2021     8:00 AM   Immunotherapy   Time since last IVIG (days): 1 8 9 days   Current treatment: 100 g every 2 wks 100 gms IVIg every 2 weeks 90 gms IVIg q2wks         Mental state: Alert, appropriate, speech, language, and thought content normal.     Sensory examination:     Right Left   Light touch Normal Normal   Vibration (timed) 3 4   Vibration (Rydell-Seiffer)     Temp     Pin Normal Normal   Pos     Legend:   MM = medial malleolus, TT = tibial tuberosity, K = patella, MCP = MCP joint  MF = mid-foot, DC = distal calf, MC = mid calf, PC = proximal calf      Motor examination:     Right Left   Shoulder abduction  5 5   Elbow extension 5 5   Elbow flexion 5 5   Wrist extension  5 5   Finger extension 4 5   FDI 4 4+   APB 5 5   Hip flexion 5 5   Knee flexion 5 5   Knee extension 5 5   Dorsiflexion 5 3-   Plantar flexion 5 5   A=atrophy    FPL, FDP 5 all    Ankle Eversion 5/4-  Ankle Inversion5/5      Tone normal     Reflexes:   Right Left   Biceps 0 Tr   Patellar 2 2   Achilles 0 0   Clonus Absent Absent      Coordination:  Finger-nose normal.  Heel-shin normal.  RRMs " "normal.    Gait:  Normal.    Impression:  Clearly weaker.  We reviewed records; I had recommended ID follow up if we were to use rituximab, but this was not scheduled. I agree that Rituximab may be beneficial based upon his prior experience but would need ID follow up as he has had no vaccinations.     Recommendations from patient instructions:    50 gms IVIg \"boost\" now  Hand therapy referral to test pinch and  strength right hand at [facility]; ideally this week before infusion and 1 week after  Infectious disease referral regarding Rituximab  Follow up 1 PM Sept 12 for a brief re-examination  6 month in-person follow up      Lukas Hernandez M.D.      32 minutes spent on the date of the encounter on chart review, history and examination, documentation and further activities as noted above.        Again, thank you for allowing me to participate in the care of your patient.        Sincerely,        Lukas Hernandez MD  "

## 2023-08-22 NOTE — PROGRESS NOTES
"Return visit for 68 year old man with MMN. Feels weaker in LEs. \"Boost\" works for a while. No TRFs.     Neuropathy Assessments      8/22/2023     8:00 AM 2/28/2023     9:00 AM 8/30/2022     8:00 AM 5/9/2022     4:00 PM 5/9/2022     3:00 PM 4/22/2022     4:00 PM 6/22/2021     8:00 AM   Neurology Assessments   RODS MMN Score 50 0 50  50 50 50   ONLS Overall Score:    2            8/22/2023     9:00 AM 5/9/2022     4:29 PM 6/22/2021     8:29 AM    Strength:   Right hand strength in lbs: 68 81 81   Left hand strength in lbs: 66 85 92         8/22/2023     9:00 AM 5/9/2022     4:00 PM 6/22/2021     8:00 AM   Immunotherapy   Time since last IVIG (days): 1 8 9 days   Current treatment: 100 g every 2 wks 100 gms IVIg every 2 weeks 90 gms IVIg q2wks         Mental state: Alert, appropriate, speech, language, and thought content normal.     Sensory examination:     Right Left   Light touch Normal Normal   Vibration (timed) 3 4   Vibration (Rydell-Seiffer)     Temp     Pin Normal Normal   Pos     Legend:   MM = medial malleolus, TT = tibial tuberosity, K = patella, MCP = MCP joint  MF = mid-foot, DC = distal calf, MC = mid calf, PC = proximal calf      Motor examination:     Right Left   Shoulder abduction  5 5   Elbow extension 5 5   Elbow flexion 5 5   Wrist extension  5 5   Finger extension 4 5   FDI 4 4+   APB 5 5   Hip flexion 5 5   Knee flexion 5 5   Knee extension 5 5   Dorsiflexion 5 3-   Plantar flexion 5 5   A=atrophy    FPL, FDP 5 all    Ankle Eversion 5/4-  Ankle Inversion5/5      Tone normal     Reflexes:   Right Left   Biceps 0 Tr   Patellar 2 2   Achilles 0 0   Clonus Absent Absent      Coordination:  Finger-nose normal.  Heel-shin normal.  RRMs normal.    Gait:  Normal.    Impression:  Clearly weaker.  We reviewed records; I had recommended ID follow up if we were to use rituximab, but this was not scheduled. I agree that Rituximab may be beneficial based upon his prior experience but would need ID follow " "up as he has had no vaccinations.     Recommendations from patient instructions:    50 gms IVIg \"boost\" now  Hand therapy referral to test pinch and  strength right hand at [facility]; ideally this week before infusion and 1 week after  Infectious disease referral regarding Rituximab  Follow up 1 PM Sept 12 for a brief re-examination  6 month in-person follow up      Lukas Hernandez M.D.      32 minutes spent on the date of the encounter on chart review, history and examination, documentation and further activities as noted above.      "

## 2023-08-25 NOTE — TELEPHONE ENCOUNTER
DIAGNOSIS: MMN (multifocal motor neuropathy). Ref by Dr Hernandez. Sched per pt     DATE RECEIVED: 9.19.23   NOTES (Gather within 2 years) STATUS DETAILS   OFFICE NOTE from referring provider   Internal 8.22.23, 2.28.23, 8.30.22 + more with  Mary  Neurology   OFFICE NOTE from other specialist Internal 6.28.23  [facility]  ID   DISCHARGE SUMMARY from hospital     DISCHARGE REPORT from the ER     LABS (any labs) Internal    MEDICATION LIST Internal    IMAGING  (NEED IMAGES AND REPORTS)     Osteomyelitis: Foot imaging      Liver Abscess: Abdominal imaging     Other (anything related to diagnoses

## 2023-09-11 NOTE — PROGRESS NOTES
GENERAL ID CLINIC    VIRTUAL VISIT     Started at 9 am and ended at 9:45 am             Assessment and Recommendations:   Problem List:    # Motor neuropathy    Discussion:    Mr. 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 Lukas Hernandez). Patient is on IVIG treatment. Per Dr. Hernandez's note, the patient had a flare with deterioration with weakness of proximal lower limb and the IVIG dose was increased. Dr. Hernandez is considering initiation of rituximab because, per Dr. Hernandez, this was associated with a reduction in IVIG dependence when used previously.  Dr. Hernandez referred the patient for ID pre-evaluation in anticipation for rituximab treatment, specificaly to determine risk of COVID since the patient was never vaccinated for COVID (refused).     Patient states that his neurologic symptoms are improving after the increased dose of IVIG. He had his port replaced yesterday (9/18/23) and he is infusing the IVIG through the port. The patient specifically asked me about the risks of developing COVID without receiving the vaccine. He also tells me that in the past he had antibodies for COVID checked an they were positive. I explained that with the new strains of COVID the antibodies he produces would likely not be active anymore. For that reason the CDC released a new COVID vaccine. I explained that ritiuximab will act on the B lymphocytes which are the immune cells that produce antibodies. The ability to produce antibodies is essential for the development of adequate response to COVID. I explained that it would be ideal if he received the COVID vaccine 2 weeks or longer before the initiation of rituximab. I explained that, without receiving the COVID vaccine the initiation of rituximab will significantly increase the chance of severe COVID disease if he happens to become infected. He asked me about the side effectes of the COVOD vaccine and I explained that the most common  are pain and inflammations at the site of injection which goes away in 2-3 days; flu like symptoms which goes away in 2-3 days. The severe side effects (Margaret [place] syndrome, myocarditis or thrombotic events) are extremely rare according to CDC. I explained that in the past we used to have pre-formed monoclonal antibodies for pre-exposure prophylaxis in immuncompromised patiens ([facility]) but we still do not have this type of treatment for the current strains of COVID yet (according to the COVID IDSA guidelines). I also explained that it will be important to receive the flu vaccine and RSV vaccine at least 2 weeks before immunosuppression.  Chilo still does not want to receive the COVID vaccine and he tells me that he will defer the rituximab treatment for now. I explained that, if he and Dr. Hernandez decide to re-consider the rituximab treatment in the future, our team will need to be contacted because additional tests will need to be performed such as: HIV, Hepatitis C, quantiferon and HBV DNA (he is immune for hepatitis B but has hepatitis H c antibody positive and hepatitis B prophylaxis would need to be considered if plan to start rituximab). ANother test that might be considered is baseline YOCASTA virus serology    Recommendations:    Please see discussion. Recommendations are incorporated to the discussion    Recommendations discussed with the patient      Ashley Hung MD  Date of Service: 09/19/23    On 09/19/23, I spent 60 min with chart review, patient visit, documentation and coordination of care.          History of Present Illness:   Mr. 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 Lukas Hernandez). Patient is on IVIG treatment. Per Dr. Hernandez's note, the patient had a flare with deterioration with weakness of proximal lower limb and the IVIG dose was increased. Dr. Hernandez is considering initiation of rituximab because, per Dr. Hernandez, this was associated  with a reduction in IVIG dependence when used previously.  Dr. Hernandez referred the patient for ID pre-evaluation in anticipation for rituximab treatment, specificaly to determine risk of COVID since the patient wss never vaccinated for COVID (refused).     Today's visit:    Patient states that his neurologic symptoms are improving after the increased dose of IVIG. He had his port replaced yesterday (9/18/23) and he is infusing the IVIG through the port. The patient specifically asked me about the risks of developing COVID without receiving the vaccine. He also tells me that oin the past he had antibodies for COVID checked an they were positive. I explained that with the new strains of COVID the antibodies he produces in the past would likely not be active anymore. Fir that reason the CDC released a new COVID vaccine. I explained that ritiximab will act on the B lymphocytes which are the immune cells that produce antibodies. The ability to produce antibodies is essential for the development of adequate response to COVID. I explained that it would be ideal if he received the COVID vaccine 2 weeks of longer before the initiation of rituximab. I explained that, without receiving the COVID vaccine the initiation of rituximab will significantly increase the chance of severe COVID disease if he happens to become infected. He asked me abput the side effectes of the COVOD vaccine and I explained that the most common are pain and inflammations at the site of injection which goes away in 2-3 days; flu like symptoms which goes away in 2-3 days. The severe side effects (Margaret One[place] syndrome, myocarditis or thrombotic events are extremely rare according to CDC). I explained that in the past we used to have pre-formed monoclonal antibodies for pre-exposure prophylaxis in immuncompromised patiens ([facility]) but we still do not have this type of treatment for the current strains of COVID yet. I also explained that it will be  important to receive the flu vaccine and RSV vaccine at least 2 weeks before immunoisuppression.  Dennies still does not want to receive the COVID vaccine and he tells me that he will defer the rituximab treatment for now. I explained that, if he and Dr. Hernandez decide to re-consider the rituximab treatment in the future we will need to be contacted because additional tests will need to be performed such as: HIV, Hepatitis C, quantiferon and HBV DNA (he is immune for hepatitis B but has hepatitis H c antibody positive and hepatitis B prophylaxis would need to be considered if plan to start rituximab). One tests that might be considered is baseline YOCASTA virus serology             Review of Systems:     CONSTITUTIONAL:  No fevers or chills  INTEGUMENTARY/SKIN: NEGATIVE for worrisome rashes, moles or lesions  EYES: Negative for icterus, vision changes or irritation  ENT/MOUTH:  Negative for oral lesions and sore throat  RESPIRATORY:  Negative for cough and dyspnea  CARDIOVASCULAR:  Negative for chest pain, palpitations and  shortness of breath  GASTROINTESTINAL:  Negative for abdominal pain, nausea, vomiting, diarrhea and constipation  GENITOURINARY:  Negative for dysuria, hematuria, frequency and urgency  MUSCULOSKELETAL: Negative for joint pain, swelling, motion restriction, negative for musculoskeletal pain  NEURO:  See HPI  PSYCHIATRIC: Negative for changes in mood or affect  HEMATOLOGIC/LYMPHATIC: negative for lymphadenopathy or bleeding  ALLERGIC/IMMUNOLOGIC: Negative for allergic reaction   ENDOCRINE: Negative for temperature intolerance, skin/hair changes         Past Medical History:     See HPI    Had 3 port placements in the past 20 years for IVOG treatment        Allergies:       No Known Allergies          Family History:     Family History   Problem Relation Age of Onset     Diabetes Mother      Coronary Artery Disease Father      Hyperlipidemia Father      Cerebrovascular Disease No family hx of      Breast  Cancer No family hx of      Colon Cancer No family hx of      Prostate Cancer No family hx of      Thyroid Disease No family hx of              Social History:     Social History     Socioeconomic History     Marital status:      Spouse name: Not on file     Number of children: Not on file     Years of education: Not on file     Highest education level: Not on file   Occupational History     Not on file   Tobacco Use     Smoking status: Former     Smokeless tobacco: Never   Substance and Sexual Activity     Alcohol use: Not on file     Drug use: Not on file     Sexual activity: Not on file   Other Topics Concern     Parent/sibling w/ CABG, MI or angioplasty before 65F 55M? 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   HEAD: Normocephalic and atraumatic  ENT:  No hearing impairment, oral mucous membranes moist  EYES:  Eyes grossly normal to inspection  LUNGS:  Breathing normally on room air  NEUROLOGIC: Mentation intact and speech normal  PSYCHIATRIC: Mood stable, mentation appears normal, affect normal

## 2023-09-13 ENCOUNTER — MYC MEDICAL ADVICE (OUTPATIENT)
Dept: NEUROLOGY | Facility: CLINIC | Age: 68
End: 2023-09-13
Payer: COMMERCIAL

## 2023-09-14 NOTE — CONFIDENTIAL NOTE
"RN contacted the pt to get an update. He was in seeing the surgeon yesterday and the plan is for his port to be replaced on Monday 9/18. While he was there in clinic, they placed a peripheral IV so that he could go home and self-infuse his IVIg. He was able to get his regular dose of 100g in and would like to know if Dr Hernandez is okay with him repeating the 100g again on Monday 9/18 once his port is in. This would mean that the two doses would be 5 days apart. He would then resume his regular schedule of infusing once every 2 weeks.   I asked how things have been seeing as though he was 11 days late on his infusion and he feels he may have lost about 30-40% of his strength in his hands and legs. He is able to do stairs \"okay\" but would not trust himself on a ladder. His legs feel unstable but not to the point where he is falling. He is even able to stand on one foot. He was grateful that he was able to get that 50g boost before this all went down.       We will need to inform his insurance company that we are not going to proceed with infusions at [facility].    Encounter routed to Dr Hernandez to review and advise on whether or not he is okay to proceed with the 100g on Monday 9/18.     Micheline Peters RN Care Coordinator   Neurology/Neurosurgery/PM&R/ Pain Management     "

## 2023-09-15 RX ORDER — ALBUTEROL SULFATE 0.83 MG/ML
2.5 SOLUTION RESPIRATORY (INHALATION)
Status: CANCELLED | OUTPATIENT
Start: 2023-09-15

## 2023-09-15 RX ORDER — DIPHENHYDRAMINE HCL 25 MG
50 CAPSULE ORAL ONCE
Status: CANCELLED | OUTPATIENT
Start: 2023-09-15

## 2023-09-15 RX ORDER — LIDOCAINE/PRILOCAINE 2.5 %-2.5%
CREAM (GRAM) TOPICAL ONCE
Status: CANCELLED
Start: 2023-09-15 | End: 2023-09-15

## 2023-09-15 RX ORDER — ALBUTEROL SULFATE 90 UG/1
1-2 AEROSOL, METERED RESPIRATORY (INHALATION)
Status: CANCELLED
Start: 2023-09-15

## 2023-09-15 RX ORDER — EPINEPHRINE 1 MG/ML
0.3 INJECTION, SOLUTION INTRAMUSCULAR; SUBCUTANEOUS EVERY 5 MIN PRN
Status: CANCELLED | OUTPATIENT
Start: 2023-09-15

## 2023-09-15 RX ORDER — HEPARIN SODIUM (PORCINE) LOCK FLUSH IV SOLN 100 UNIT/ML 100 UNIT/ML
5 SOLUTION INTRAVENOUS
Status: CANCELLED | OUTPATIENT
Start: 2023-09-15

## 2023-09-15 RX ORDER — HEPARIN SODIUM,PORCINE 10 UNIT/ML
5-20 VIAL (ML) INTRAVENOUS DAILY PRN
Status: CANCELLED | OUTPATIENT
Start: 2023-09-15

## 2023-09-15 RX ORDER — MEPERIDINE HYDROCHLORIDE 25 MG/ML
25 INJECTION INTRAMUSCULAR; INTRAVENOUS; SUBCUTANEOUS EVERY 30 MIN PRN
Status: CANCELLED | OUTPATIENT
Start: 2023-09-15

## 2023-09-15 RX ORDER — METHYLPREDNISOLONE SODIUM SUCCINATE 125 MG/2ML
125 INJECTION, POWDER, LYOPHILIZED, FOR SOLUTION INTRAMUSCULAR; INTRAVENOUS
Status: CANCELLED
Start: 2023-09-15

## 2023-09-15 RX ORDER — DIPHENHYDRAMINE HYDROCHLORIDE 50 MG/ML
50 INJECTION INTRAMUSCULAR; INTRAVENOUS
Status: CANCELLED
Start: 2023-09-15

## 2023-09-19 ENCOUNTER — PRE VISIT (OUTPATIENT)
Dept: INFECTIOUS DISEASES | Facility: CLINIC | Age: 68
End: 2023-09-19
Payer: COMMERCIAL

## 2023-09-19 ENCOUNTER — VIRTUAL VISIT (OUTPATIENT)
Dept: INFECTIOUS DISEASES | Facility: CLINIC | Age: 68
End: 2023-09-19
Attending: PSYCHIATRY & NEUROLOGY
Payer: COMMERCIAL

## 2023-09-19 DIAGNOSIS — G61.82 MMN (MULTIFOCAL MOTOR NEUROPATHY) (H): ICD-10-CM

## 2023-09-19 PROCEDURE — 99417 PROLNG OP E/M EACH 15 MIN: CPT | Performed by: INTERNAL MEDICINE

## 2023-09-19 PROCEDURE — 99215 OFFICE O/P EST HI 40 MIN: CPT | Mod: VID | Performed by: INTERNAL MEDICINE

## 2023-09-19 NOTE — NURSING NOTE
Is the patient currently in the state of [place]? YES    Visit mode:VIDEO    If the visit is dropped, the patient can be reconnected by: VIDEO VISIT: Text to cell phone:   Telephone Information:   Mobile [phone]       Will anyone else be joining the visit? NO  (If patient encounters technical issues they should call [phone]    How would you like to obtain your AVS? MyChart    Are changes needed to the allergy or medication list? N/A    Reason for visit: Consult (MMN (multifocal motor neuropathy)    Sandrita Morris VVF

## 2023-09-19 NOTE — LETTER
9/19/2023       RE: Chilo Moran  [address] [facility] [address]     Dear Colleague,    Thank you for referring your patient, Chilo Moran, to the [facility] INFECTIOUS DISE[facility]INIC [place] at [facility]. Please see a copy of my visit note below.       GENERAL ID CLINIC    VIRTUAL VISIT     Started at 9 am and ended at 9:45 am             Assessment and Recommendations:   Problem List:    # Motor neuropathy    Discussion:    Mr. 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 Lukas Hernandez). Patient is on IVIG treatment. Per Dr. Hernandez's note, the patient had a flare with deterioration with weakness of proximal lower limb and the IVIG dose was increased. Dr. Hernandez is considering initiation of rituximab because, per Dr. Hernandez, this was associated with a reduction in IVIG dependence when used previously.  Dr. Hernandez referred the patient for ID pre-evaluation in anticipation for rituximab treatment, specificaly to determine risk of COVID since the patient was never vaccinated for COVID (refused).     Patient states that his neurologic symptoms are improving after the increased dose of IVIG. He had his port replaced yesterday (9/18/23) and he is infusing the IVIG through the port. The patient specifically asked me about the risks of developing COVID without receiving the vaccine. He also tells me that in the past he had antibodies for COVID checked an they were positive. I explained that with the new strains of COVID the antibodies he produces would likely not be active anymore. For that reason the CDC released a new COVID vaccine. I explained that ritiuximab will act on the B lymphocytes which are the immune cells that produce antibodies. The ability to produce antibodies is essential for the development of adequate response to COVID. I explained that it would be ideal if he received the COVID vaccine 2 weeks or  longer before the initiation of rituximab. I explained that, without receiving the COVID vaccine the initiation of rituximab will significantly increase the chance of severe COVID disease if he happens to become infected. He asked me about the side effectes of the COVOD vaccine and I explained that the most common are pain and inflammations at the site of injection which goes away in 2-3 days; flu like symptoms which goes away in 2-3 days. The severe side effects (Margaret [place] syndrome, myocarditis or thrombotic events) are extremely rare according to CDC. I explained that in the past we used to have pre-formed monoclonal antibodies for pre-exposure prophylaxis in immuncompromised patiens ([facility]) but we still do not have this type of treatment for the current strains of COVID yet (according to the COVID IDSA guidelines). I also explained that it will be important to receive the flu vaccine and RSV vaccine at least 2 weeks before immunosuppression.  Chilo still does not want to receive the COVID vaccine and he tells me that he will defer the rituximab treatment for now. I explained that, if he and Dr. Hernandez decide to re-consider the rituximab treatment in the future, our team will need to be contacted because additional tests will need to be performed such as: HIV, Hepatitis C, quantiferon and HBV DNA (he is immune for hepatitis B but has hepatitis H c antibody positive and hepatitis B prophylaxis would need to be considered if plan to start rituximab). ANother test that might be considered is baseline YOCASTA virus serology    Recommendations:    Please see discussion. Recommendations are incorporated to the discussion    Recommendations discussed with the patient      Ashley Hung MD  Date of Service: 09/19/23    On 09/19/23, I spent 60 min with chart review, patient visit, documentation and coordination of care.          History of Present Illness:   Mr. Chilo Moran is a year-old male with PMHx of  multifocal motor neuropathy managed by neurology (Dr. Lukas Hernandez). Patient is on IVIG treatment. Per Dr. Hernandez's note, the patient had a flare with deterioration with weakness of proximal lower limb and the IVIG dose was increased. Dr. Hernandez is considering initiation of rituximab because, per Dr. Hernandez, this was associated with a reduction in IVIG dependence when used previously.  Dr. Hernandez referred the patient for ID pre-evaluation in anticipation for rituximab treatment, specificaly to determine risk of COVID since the patient wss never vaccinated for COVID (refused).     Today's visit:    Patient states that his neurologic symptoms are improving after the increased dose of IVIG. He had his port replaced yesterday (9/18/23) and he is infusing the IVIG through the port. The patient specifically asked me about the risks of developing COVID without receiving the vaccine. He also tells me that oin the past he had antibodies for COVID checked an they were positive. I explained that with the new strains of COVID the antibodies he produces in the past would likely not be active anymore. Fir that reason the CDC released a new COVID vaccine. I explained that ritiximab will act on the B lymphocytes which are the immune cells that produce antibodies. The ability to produce antibodies is essential for the development of adequate response to COVID. I explained that it would be ideal if he received the COVID vaccine 2 weeks of longer before the initiation of rituximab. I explained that, without receiving the COVID vaccine the initiation of rituximab will significantly increase the chance of severe COVID disease if he happens to become infected. He asked me abput the side effectes of the COVOD vaccine and I explained that the most common are pain and inflammations at the site of injection which goes away in 2-3 days; flu like symptoms which goes away in 2-3 days. The severe side effects (Margaret Brem[place] syndrome, myocarditis or  thrombotic events are extremely rare according to CDC). I explained that in the past we used to have pre-formed monoclonal antibodies for pre-exposure prophylaxis in immuncompromised patiens ([facility]) but we still do not have this type of treatment for the current strains of COVID yet. I also explained that it will be important to receive the flu vaccine and RSV vaccine at least 2 weeks before immunoisuppression.  Dennies still does not want to receive the COVID vaccine and he tells me that he will defer the rituximab treatment for now. I explained that, if he and Dr. Hernandez decide to re-consider the rituximab treatment in the future we will need to be contacted because additional tests will need to be performed such as: HIV, Hepatitis C, quantiferon and HBV DNA (he is immune for hepatitis B but has hepatitis H c antibody positive and hepatitis B prophylaxis would need to be considered if plan to start rituximab). One tests that might be considered is baseline YOCASTA virus serology             Review of Systems:     CONSTITUTIONAL:  No fevers or chills  INTEGUMENTARY/SKIN: NEGATIVE for worrisome rashes, moles or lesions  EYES: Negative for icterus, vision changes or irritation  ENT/MOUTH:  Negative for oral lesions and sore throat  RESPIRATORY:  Negative for cough and dyspnea  CARDIOVASCULAR:  Negative for chest pain, palpitations and  shortness of breath  GASTROINTESTINAL:  Negative for abdominal pain, nausea, vomiting, diarrhea and constipation  GENITOURINARY:  Negative for dysuria, hematuria, frequency and urgency  MUSCULOSKELETAL: Negative for joint pain, swelling, motion restriction, negative for musculoskeletal pain  NEURO:  See HPI  PSYCHIATRIC: Negative for changes in mood or affect  HEMATOLOGIC/LYMPHATIC: negative for lymphadenopathy or bleeding  ALLERGIC/IMMUNOLOGIC: Negative for allergic reaction   ENDOCRINE: Negative for temperature intolerance, skin/hair changes         Past Medical History:     See  HPI    Had 3 port placements in the past 20 years for IVOG treatment        Allergies:       No Known Allergies          Family History:     Family History   Problem Relation Age of Onset     Diabetes Mother      Coronary Artery Disease Father      Hyperlipidemia Father      Cerebrovascular Disease No family hx of      Breast Cancer No family hx of      Colon Cancer No family hx of      Prostate Cancer No family hx of      Thyroid Disease No family hx of              Social History:     Social History     Socioeconomic History     Marital status:      Spouse name: Not on file     Number of children: Not on file     Years of education: Not on file     Highest education level: Not on file   Occupational History     Not on file   Tobacco Use     Smoking status: Former     Smokeless tobacco: Never   Substance and Sexual Activity     Alcohol use: Not on file     Drug use: Not on file     Sexual activity: Not on file   Other Topics Concern     Parent/sibling w/ CABG, MI or angioplasty before 65F 55M? 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   HEAD: Normocephalic and atraumatic  ENT:  No hearing impairment, oral mucous membranes moist  EYES:  Eyes grossly normal to inspection  LUNGS:  Breathing normally on room  air  NEUROLOGIC: Mentation intact and speech normal  PSYCHIATRIC: Mood stable, mentation appears normal, affect normal      Virtual Visit Details    Type of service:  Video Visit     Visit started at 9:00 am and ended at 9:45 am    Originating Location (pt. Location): Home    Distant Location (provider location):  Off-site  Platform used for Video Visit: Jesus    Unable to edit existing provider note, new note created      Again, thank you for allowing me to participate in the care of your patient.      Sincerely,    ZONIA RICHARDSON MD

## 2023-09-19 NOTE — PROGRESS NOTES
Virtual Visit Details    Type of service:  Video Visit     Visit started at 9:00 am and ended at 9:45 am    Originating Location (pt. Location): Home    Distant Location (provider location):  Off-site  Platform used for Video Visit: Jesus    Unable to edit existing provider note, new note created

## 2024-02-27 ENCOUNTER — VIRTUAL VISIT (OUTPATIENT)
Dept: NEUROLOGY | Facility: CLINIC | Age: 69
End: 2024-02-27
Payer: COMMERCIAL

## 2024-02-27 DIAGNOSIS — G61.82 MMN (MULTIFOCAL MOTOR NEUROPATHY) (H): Primary | ICD-10-CM

## 2024-02-27 PROCEDURE — 99213 OFFICE O/P EST LOW 20 MIN: CPT | Mod: 95 | Performed by: PSYCHIATRY & NEUROLOGY

## 2024-02-27 NOTE — PROGRESS NOTES
Black is a 69 year old who is being evaluated via a billable video visit.      How would you like to obtain your AVS? MyChart  If the video visit is dropped, the invitation should be resent by: Send to e-mail at: [email or web address]  Will anyone else be joining your video visit? No        Video-Visit Details    Type of service:  Video Visit     Originating Location (pt. Location):     Distant Location (provider location):    Platform used for Video Visit:   WILMAN Hubbard, KAYLA ([facility])

## 2024-02-27 NOTE — Clinical Note
1. Please modify therapy plan as indicated. 2. If possible for the patient, OK to use a 4 PM slot on June 17 or the 9 AM slot on June 25. Thanks.

## 2024-02-27 NOTE — LETTER
"    2/27/2024         RE: Chilo Moran  [address] [facility] [id number]        Dear Colleague,    Thank you for referring your patient, Chilo Moran, to the [facility] NEUROLOGY CLINIC [place]. Please see a copy of my visit note below.    Black is a 69 year old who is being evaluated via a billable video visit.      How would you like to obtain your AVS? MyChart  If the video visit is dropped, the invitation should be resent by: Send to e-mail at: [email or web address]  Will anyone else be joining your video visit? No        Video-Visit Details    Type of service:  Video Visit     Originating Location (pt. 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 Did very well with recent additional IVIg dose, which appears to be needed every 6 months. Examination is limited as this is a video visit; able to extend and spread digits with ease, and able to dorsiflex through full or nearly full ROM against gravity at the left ankle.    Recommendations    We will add the \"boost\" dose of 50 gms every 6 months.  Return in-person visit when you are here in June.  Repeat creatinine level at that time.       Lukas Hernandez M.D.    20 minutes spent on the date of the encounter on chart review, history and examination, documentation and further activities as noted above.       Again, thank you for allowing me to participate in the care of your patient.        Sincerely,        Lukas Hernandez MD  "

## 2024-02-27 NOTE — PATIENT INSTRUCTIONS
"We will add the \"boost\" dose of 50 gms every 6 months.  Return in-person visit when you are here in June.  Repeat creatinine level at that time.   "

## 2024-02-27 NOTE — PROGRESS NOTES
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 Did very well with recent additional IVIg dose, which appears to be needed every 6 months. Examination is limited as this is a video visit; able to extend and spread digits with ease, and able to dorsiflex through full or nearly full ROM against gravity at the left ankle.    Recommendations    We will add the \"boost\" dose of 50 gms every 6 months.  Return in-person visit when you are here in June.  Repeat creatinine level at that time.       Lukas Hernandez M.D.    20 minutes spent on the date of the encounter on chart review, history and examination, documentation and further activities as noted above.   "

## 2024-03-06 ENCOUNTER — TELEPHONE (OUTPATIENT)
Dept: NEUROLOGY | Facility: CLINIC | Age: 69
End: 2024-03-06
Payer: COMMERCIAL

## 2024-03-06 NOTE — TELEPHONE ENCOUNTER
Patient called writer and requested the June 17 th at 400 PM return visit with Dr Hernandez.  Writer also conveyed message of  Dr Hernandez's that the infusions could be a few days later or earlier and that symptoms may get worse for a few days.   Patient understood and told writer that he was going to request his infusion done two days earlier due to his schedule.   Patient understood and writer ended the call.  TYREL Hubbard., KAYLA ([facility])

## 2024-06-17 ENCOUNTER — VIRTUAL VISIT (OUTPATIENT)
Dept: NEUROLOGY | Facility: CLINIC | Age: 69
End: 2024-06-17
Payer: COMMERCIAL

## 2024-06-17 DIAGNOSIS — G61.82 MMN (MULTIFOCAL MOTOR NEUROPATHY) (H): Primary | ICD-10-CM

## 2024-06-17 PROCEDURE — 99212 OFFICE O/P EST SF 10 MIN: CPT | Mod: 95 | Performed by: PSYCHIATRY & NEUROLOGY

## 2024-06-17 NOTE — PROGRESS NOTES
Black is a 69 year old who is being evaluated via a billable video visit.    How would you like to obtain your AVS? [employer]  If the video visit is dropped, the invitation should be resent by: Text to cell phone: [phone]  Will anyone else be joining your video visit? No    Video-Visit Details    Type of service:  Video Visit   Originating Location (pt. Location): Home    Distant Location (provider location):  On-site  Platform used for Video Visit: [employer]

## 2024-06-17 NOTE — LETTER
6/17/2024      Chilo Moran  [address] [place] [id number]      Dear Colleague,    Thank you for referring your patient, Chilo Moran, to the [facility] NEUROLOGY CLINIC [place]. Please see a copy of my visit note below.    Black is a 69 year old who is being evaluated via a billable video visit.    How would you like to obtain your AVS? MyChart  If the video visit is dropped, the invitation should be resent by: Text to cell phone: [phone]  Will anyone else be joining your video visit? No    Video-Visit Details    Type of service:  Video Visit   Originating Location (pt. Location): Home    Distant Location (provider location):  On-site  Platform used for Video Visit: [employer]     [facility] video visit for 69 year old man with MMN. His condition is stable and he is responding well on current regimen, with scheduled additional infusions. He did have a recent CMP, with a normal creatinine. We will make no changes to his treatment regimen, and I will ask for a follow up visit to be scheduled in-person this fall. We also discussed the potential for novel approved therapies for CIDP and MMN.      Lukas Hernandez M.D.    10 minutes spent on the date of the encounter on chart review, history and examination, documentation and further activities as noted above.       Again, thank you for allowing me to participate in the care of your patient.        Sincerely,        Lukas Hernandez MD

## 2024-06-20 NOTE — PROGRESS NOTES
Return video visit for 69 year old man with MMN. His condition is stable and he is responding well on current regimen, with scheduled additional infusions. He did have a recent CMP, with a normal creatinine. We will make no changes to his treatment regimen, and I will ask for a follow up visit to be scheduled in-person this fall. We also discussed the potential for novel approved therapies for CIDP and MMN.      Lukas Hernandez M.D.    10 minutes spent on the date of the encounter on chart review, history and examination, documentation and further activities as noted above.

## 2024-06-21 ENCOUNTER — TELEPHONE (OUTPATIENT)
Dept: NEUROSURGERY | Facility: CLINIC | Age: 69
End: 2024-06-21
Payer: COMMERCIAL

## 2024-06-21 NOTE — TELEPHONE ENCOUNTER
Lukas Hernandez MD  [facility] Neurology [place]  Please arrange an in-person follow up visit in late 2024.

## 2024-06-23 ENCOUNTER — HEALTH MAINTENANCE LETTER (OUTPATIENT)
Age: 69
End: 2024-06-23

## 2024-09-04 ENCOUNTER — MYC MEDICAL ADVICE (OUTPATIENT)
Dept: NEUROLOGY | Facility: CLINIC | Age: 69
End: 2024-09-04
Payer: COMMERCIAL

## 2024-09-04 ENCOUNTER — DOCUMENTATION ONLY (OUTPATIENT)
Dept: NEUROLOGY | Facility: CLINIC | Age: 69
End: 2024-09-04
Payer: COMMERCIAL

## 2024-09-04 NOTE — PROGRESS NOTES
RN received a call from [facility] requesting clinical documentation from the last couple of office visits in order to submit another prior authorization. This has been completed and a confirmation of successful delivery was received.    Micheline Peters RN Care Coordinator   Neurology/Neurosurgery/PM&R/ Pain Management

## 2024-09-06 RX ORDER — EPINEPHRINE 1 MG/ML
0.3 INJECTION, SOLUTION INTRAMUSCULAR; SUBCUTANEOUS EVERY 5 MIN PRN
OUTPATIENT
Start: 2024-09-06

## 2024-09-06 RX ORDER — METHYLPREDNISOLONE SODIUM SUCCINATE 125 MG/2ML
125 INJECTION, POWDER, LYOPHILIZED, FOR SOLUTION INTRAMUSCULAR; INTRAVENOUS
Start: 2024-09-06

## 2024-09-06 RX ORDER — DIPHENHYDRAMINE HCL 25 MG
50 CAPSULE ORAL ONCE
OUTPATIENT
Start: 2024-09-06

## 2024-09-06 RX ORDER — LIDOCAINE/PRILOCAINE 2.5 %-2.5%
CREAM (GRAM) TOPICAL ONCE
Start: 2024-09-06 | End: 2024-09-06

## 2024-09-06 RX ORDER — HEPARIN SODIUM,PORCINE 10 UNIT/ML
5-20 VIAL (ML) INTRAVENOUS DAILY PRN
OUTPATIENT
Start: 2024-09-06

## 2024-09-06 RX ORDER — ALBUTEROL SULFATE 90 UG/1
1-2 AEROSOL, METERED RESPIRATORY (INHALATION)
Start: 2024-09-06

## 2024-09-06 RX ORDER — DIPHENHYDRAMINE HYDROCHLORIDE 50 MG/ML
50 INJECTION INTRAMUSCULAR; INTRAVENOUS
Start: 2024-09-06

## 2024-09-06 RX ORDER — HEPARIN SODIUM (PORCINE) LOCK FLUSH IV SOLN 100 UNIT/ML 100 UNIT/ML
5 SOLUTION INTRAVENOUS
OUTPATIENT
Start: 2024-09-06

## 2024-09-06 RX ORDER — ALBUTEROL SULFATE 0.83 MG/ML
2.5 SOLUTION RESPIRATORY (INHALATION)
OUTPATIENT
Start: 2024-09-06

## 2024-09-06 RX ORDER — MEPERIDINE HYDROCHLORIDE 25 MG/ML
25 INJECTION INTRAMUSCULAR; INTRAVENOUS; SUBCUTANEOUS EVERY 30 MIN PRN
OUTPATIENT
Start: 2024-09-06

## 2024-09-06 NOTE — TELEPHONE ENCOUNTER
Renewed IVIg therapy plan orders have been signed by Dr. Hernandez and faxed to [facility] at both [phone] and [phone]. Confirmation of successful fax delivery received via RightFax.      JUSTIN CastroN   RN Care Coordinator  Neurology/Neurosurgery/PM&R/Pain Management

## 2024-09-10 NOTE — TELEPHONE ENCOUNTER
Approval letter from [facility] received and the pt was notified. A copy of the approval letter was scanned into the pt's chart.    Micheline Peters RN Care Coordinator   Neurology/Neurosurgery/PM&R/ Pain Management

## 2024-09-10 NOTE — TELEPHONE ENCOUNTER
[facility] Call Center    Phone Message    May a detailed message be left on voicemail: yes     Reason for Call: Medication Question or concern regarding medication   Prescription Clarification  Name of Medication: IVIG Infusion  Prescribing Provider: Lukas Rahman prior approval calling  Phone   Needing additional clinical information to complete prior auth    Ty indicated they need this by the end of the day today 9/10/24            Action Taken: [facility]    Travel Screening: Not Applicable     Date of Service:

## 2024-09-10 NOTE — TELEPHONE ENCOUNTER
Writer spoke with Judah IGLESIAS at [facility]. Writer provided additional clinic information (most recent creatinine/BUN, weight,  strength results, IVIG dose history). Judah confirmed that they were able to approve the PA for patient's IVIG. They should be faxing the letter of approval to the clinic later today.      JUSTIN CastroN RN Care Coordinator  Neurology/Neurosurgery/PM&R/Pain Management

## 2024-10-17 ENCOUNTER — TRANSFERRED RECORDS (OUTPATIENT)
Dept: HEALTH INFORMATION MANAGEMENT | Facility: CLINIC | Age: 69
End: 2024-10-17
Payer: COMMERCIAL

## 2024-10-17 ENCOUNTER — TELEPHONE (OUTPATIENT)
Dept: NEUROLOGY | Facility: CLINIC | Age: 69
End: 2024-10-17
Payer: COMMERCIAL

## 2024-10-17 NOTE — TELEPHONE ENCOUNTER
Writer received lab results from "[facility].   Labeled and placed in provider folder for review.    TYREL Hubbard., KAYLA ([facility])

## 2024-10-29 ENCOUNTER — OFFICE VISIT (OUTPATIENT)
Dept: NEUROLOGY | Facility: CLINIC | Age: 69
End: 2024-10-29
Payer: COMMERCIAL

## 2024-10-29 VITALS
DIASTOLIC BLOOD PRESSURE: 86 MMHG | BODY MASS INDEX: 22.9 KG/M2 | HEIGHT: 70 IN | HEART RATE: 77 BPM | WEIGHT: 160 LBS | SYSTOLIC BLOOD PRESSURE: 127 MMHG

## 2024-10-29 DIAGNOSIS — G61.82 MMN (MULTIFOCAL MOTOR NEUROPATHY) (H): Primary | ICD-10-CM

## 2024-10-29 PROCEDURE — 99215 OFFICE O/P EST HI 40 MIN: CPT | Performed by: PSYCHIATRY & NEUROLOGY

## 2024-10-29 NOTE — Clinical Note
Please arrange follow up as per AVS - OK to use a 4:00 or 4:30 return neuropathy slot at [facility] if needed. Thanks.

## 2024-10-29 NOTE — PATIENT INSTRUCTIONS
Boost dose of 50 gms now (ideally between this week's dose and your next dose).  Maintain your current IVIg dose.  Another video visit with infectious disease.  Return video visit 2-3 months; call if you are not getting better.  In-person visit 6 months or so - Ok to adjust to accommodate your travel schedule.   Pain Refusal Text: I offered to prescribe pain medication but the patient refused to take this medication.

## 2024-10-29 NOTE — NURSING NOTE
"Chilo Moran's goals for this visit include:   Chief Complaint   Patient presents with    RECHECK      MMN (multifocal motor neuropathy) // return late 2024 per Dr. Hernandez       He requests these members of his care team be copied on today's visit information: yes    PCP: Joao Cage    Referring Provider:  Referred Self, MD  No address on file    /86 (BP Location: Right arm, Patient Position: Sitting, Cuff Size: Adult Regular)   Pulse 77   Ht 1.778 m (5' 10\")   Wt 72.6 kg (160 lb)   BMI 22.96 kg/m      Do you need any medication refills at today's visit? Yes    IvIg       "

## 2024-10-29 NOTE — LETTER
10/29/2024      Chilo Moran  [address] [place] [id number]      Dear Colleague,    Thank you for referring your patient, Chilo Moran, to the [facility] NEUROLOGY CLINIC [place]. Please see a copy of my visit note below.    Return visit for 69 year old man with MMN. See notes for recent events. 50% better since onset of this recent exacerbation. Denies sensory symptoms.         Neuropathy Assessments      10/28/2024     7:49 PM 10/15/2024     2:28 PM 6/16/2024     8:24 PM 2/27/2024     8:00 AM 8/22/2023     8:00 AM 2/28/2023     9:00 AM 8/30/2022     8:00 AM   Neurology Assessments   RODS MMN Score 44  47 50 50 50 0 50       Patient-reported         10/29/2024    11:00 AM 8/22/2023     9:00 AM 5/9/2022     4:29 PM 6/22/2021     8:29 AM    Strength:   Right hand strength in lbs: 72 68 81 81   Left hand strength in lbs: 79 66 85 92         10/29/2024    10:00 AM 8/22/2023     9:00 AM 5/9/2022     4:00 PM 6/22/2021     8:00 AM   Immunotherapy   Time since last IVIG (days): 2 1 8 9 days   Current treatment: IVIg 100 gm every 2 weeks, 50 gms every 6 months 100 g every 2 wks 100 gms IVIg every 2 weeks 90 gms IVIg q2wks          Mental state: Alert, appropriate, speech, language, and thought content normal.      Cranial nerves II-XII: normal.     Sensory examination:       Right Left   Light touch Normal Normal   Vibration (timed) 4 3   Vibration (Rydell-Seiffer)       Temp       Pin Normal Normal   Pos       Legend:   MM = medial malleolus, TT = tibial tuberosity, K = patella, MCP = MCP joint  MF = mid-foot, DC = distal calf, MC = mid calf, PC = proximal calf        Motor examination:       Right Left   Shoulder abduction        5 5   Elbow extension 5 5   Elbow flexion 5 5   Wrist extension         5 5   Finger extension 4 5   FDI 4 4+   APB 4 5   Hip flexion 5 5   Knee flexion 5 5   Knee extension 5 5   Dorsiflexion 5 3-   Plantar flexion 5 5   A=atrophy    FDP, pronation, supination, BRD normal  on the right     Tone normal    Mild pronator drift right     Reflexes:    Right Left   Biceps 2 2   BRD 0 2   Triceps 2 2   Patellar 2 2   Achilles 0 0   Plantar Flexor Flexor   Clonus Absent Absent   Estuardo's negative       Coordination:  Finger-nose normal.  Heel-shin normal.  RRMs normal.     Gait:  Independent, steppage left, reduced arm swing right.      Impression and recommendations:  I reviewed the past 2-3 years of examinations and visits, most recent ID consult, as well as his recent labs and imaging [which I personally interpreted]. His recent exacerbation is consistent with prior fluctuations, and almost certainly reflects MMN. We discussed options for IVIg management and rituximab, as well as potential enrollment in clinical trials. We ultimately chose the following:    Boost dose of 50 gms now (ideally between this week's dose and your next dose).  Maintain your current IVIg dose.  Another video visit with infectious disease.  Return video visit 2-3 months; call if you are not getting better.  In-person visit 6 months or so - Ok to adjust to accommodate your travel schedule.          Lukas Hernandez M.D.    42 minutes spent on the date of the encounter on chart review, history and examination, documentation and further activities as noted above.       Again, thank you for allowing me to participate in the care of your patient.        Sincerely,        Lukas Hernandez MD

## 2024-10-29 NOTE — PROGRESS NOTES
Return visit for 69 year old man with MMN. See notes for recent events. 50% better since onset of this recent exacerbation. Denies sensory symptoms.         Neuropathy Assessments      10/28/2024     7:49 PM 10/15/2024     2:28 PM 6/16/2024     8:24 PM 2/27/2024     8:00 AM 8/22/2023     8:00 AM 2/28/2023     9:00 AM 8/30/2022     8:00 AM   Neurology Assessments   RODS MMN Score 44  47 50 50 50 0 50       Patient-reported         10/29/2024    11:00 AM 8/22/2023     9:00 AM 5/9/2022     4:29 PM 6/22/2021     8:29 AM    Strength:   Right hand strength in lbs: 72 68 81 81   Left hand strength in lbs: 79 66 85 92         10/29/2024    10:00 AM 8/22/2023     9:00 AM 5/9/2022     4:00 PM 6/22/2021     8:00 AM   Immunotherapy   Time since last IVIG (days): 2 1 8 9 days   Current treatment: IVIg 100 gm every 2 weeks, 50 gms every 6 months 100 g every 2 wks 100 gms IVIg every 2 weeks 90 gms IVIg q2wks          Mental state: Alert, appropriate, speech, language, and thought content normal.      Cranial nerves II-XII: normal.     Sensory examination:       Right Left   Light touch Normal Normal   Vibration (timed) 4 3   Vibration (Rydell-Seiffer)       Temp       Pin Normal Normal   Pos       Legend:   MM = medial malleolus, TT = tibial tuberosity, K = patella, MCP = MCP joint  MF = mid-foot, DC = distal calf, MC = mid calf, PC = proximal calf        Motor examination:       Right Left   Shoulder abduction        5 5   Elbow extension 5 5   Elbow flexion 5 5   Wrist extension         5 5   Finger extension 4 5   FDI 4 4+   APB 4 5   Hip flexion 5 5   Knee flexion 5 5   Knee extension 5 5   Dorsiflexion 5 3-   Plantar flexion 5 5   A=atrophy    FDP, pronation, supination, BRD normal on the right     Tone normal    Mild pronator drift right     Reflexes:    Right Left   Biceps 2 2   BRD 0 2   Triceps 2 2   Patellar 2 2   Achilles 0 0   Plantar Flexor Flexor   Clonus Absent Absent   Estuardo's negative        Coordination:  Finger-nose normal.  Heel-shin normal.  RRMs normal.     Gait:  Independent, steppage left, reduced arm swing right.      Impression and recommendations:  I reviewed the past 2-3 years of examinations and visits, most recent ID consult, as well as his recent labs and imaging [which I personally interpreted]. His recent exacerbation is consistent with prior fluctuations, and almost certainly reflects MMN. We discussed options for IVIg management and rituximab, as well as potential enrollment in clinical trials. We ultimately chose the following:    Boost dose of 50 gms now (ideally between this week's dose and your next dose).  Maintain your current IVIg dose.  Another video visit with infectious disease.  Return video visit 2-3 months; call if you are not getting better.  In-person visit 6 months or so - Ok to adjust to accommodate your travel schedule.          Lukas Hernandez M.D.    42 minutes spent on the date of the encounter on chart review, history and examination, documentation and further activities as noted above.

## 2024-10-29 NOTE — NURSING NOTE
RN has called [facility] to give a verbal order to the pharmacist for his boost dose of IVIg. The pt was also informed and advised to call them to set up shipment for this weekend.    Order form placed in Dr Hernandez's folder for a signature.     Micheline Peters RN Care Coordinator   Neurology/Neurosurgery/PM&R/ Pain Management

## 2024-11-13 ENCOUNTER — MYC MEDICAL ADVICE (OUTPATIENT)
Dept: NEUROLOGY | Facility: CLINIC | Age: 69
End: 2024-11-13
Payer: COMMERCIAL

## 2024-11-26 ENCOUNTER — MYC MEDICAL ADVICE (OUTPATIENT)
Dept: NEUROLOGY | Facility: CLINIC | Age: 69
End: 2024-11-26
Payer: COMMERCIAL

## 2025-02-05 ENCOUNTER — VIRTUAL VISIT (OUTPATIENT)
Dept: NEUROLOGY | Facility: CLINIC | Age: 70
End: 2025-02-05
Payer: COMMERCIAL

## 2025-02-05 DIAGNOSIS — G61.82 MMN (MULTIFOCAL MOTOR NEUROPATHY) (H): Primary | ICD-10-CM

## 2025-02-05 PROCEDURE — 98006 SYNCH AUDIO-VIDEO EST MOD 30: CPT | Performed by: PSYCHIATRY & NEUROLOGY

## 2025-02-05 NOTE — LETTER
2/5/2025       RE: Chilo Moran  [address] [facility] [id number]     Dear Colleague,    Thank you for referring your patient, Chilo Moran, to the [facility] NEUROLOGY CLINIC [place] at [facility]. Please see a copy of my visit note below.    Virtual Visit Details    Type of service:  Video Visit     Originating Location (pt. Location): Home    Distant Location (provider location):  On-site  Platform used for Video Visit: [facility]    Return visit for 70 year old man with MMN. Last fall he had a characteristic exacerbation of symptoms without evident precipitant. We provided an additional 50 gms IVIg, and he reports complete resolution of symptoms subsequent to that. R-ODS is now back to 50.    We discussed the following:    His recent experience suggests that his current IVIg dosing regimen may be slightly inadequate. I suggested increasing to 120 gms every 2 weeks or making the additional 50 gm boost slightly more frequent (every 4 months), and he chooses the latter as 120 mg would be more difficult to administer logistically.   We reviewed the delay in obtaining the additional 50 gms last fall. It was because his insurance company required a prior authorization to change the dosing schedule.  We discussed Rituximab. When he was previously treated with Rituximab there was a measurable reduction in IVIg needs. He has had two I.D. consultations regarding this to assess risks in the context of not having vaccination for COVID. We discussed the fact that during the pandemic there was appropriate caution; risk remains but may be deemed lower currently for several reasons. I personally reviewed the most recent I.D. consultation, with the following conclusion and recommendations: Chilo still does not want to receive the COVID vaccine and he tells me that he will defer the rituximab treatment for now. I explained that, if he and Dr. Hernandez decide to re-consider  "the rituximab treatment in the future, our team will need to be contacted because additional tests will need to be performed such as: HIV, Hepatitis C, quantiferon and HBV DNA (he is immune for hepatitis B but has hepatitis H c antibody positive and hepatitis B prophylaxis would need to be considered if plan to start rituximab). ANother test that might be considered is baseline YOCASTA virus serology.   Mr Moran is interested in another course of Rituximab, so in keeping with I.D. recommendations I have entered a request for a follow up visit. In this context, he indicated the sense that he is being denied treatment for his condition. I explained that he is being prescribed the recognized effective therapy for MMN and that I am following infectious disease recommendations, which I feel are medically appropriate.         Neuropathy Assessments      2/3/2025     8:47 AM 10/28/2024     7:49 PM 10/15/2024     2:28 PM 6/16/2024     8:24 PM 2/27/2024     8:00 AM 8/22/2023     8:00 AM 2/28/2023     9:00 AM   Neurology Assessments   RODS MMN Score 50  44  47 50 50 50 0       Patient-reported         10/29/2024    11:00 AM 8/22/2023     9:00 AM 5/9/2022     4:29 PM 6/22/2021     8:29 AM    Strength:   Right hand strength in lbs: 72 68 81 81   Left hand strength in lbs: 79 66 85 92         10/29/2024    10:00 AM 8/22/2023     9:00 AM 5/9/2022     4:00 PM 6/22/2021     8:00 AM   Immunotherapy   Time since last IVIG (days): 2 1 8 9 days   Current treatment: IVIg 100 gm every 2 weeks, 50 gms every 6 months 100 g every 2 wks 100 gms IVIg every 2 weeks 90 gms IVIg q2wks        Recommendations [copied from patient instructions]:    Plan is as follows:    Continue current IVIg dose of 100 gms every 2 weeks.  Change the \"boost\" IVIg to 50 gms every 4 months.  Infectious disease consultation was ordered in October but did not occur. I will re-order it. The purpose is to review the risks of Rituximab without further vaccination and to " guide us regarding labs to obtain prior to possible Rituximab treatment (per Dr Hung's note from 2023).  Return video visit 4 months, in-person every 1-2 years.      Lukas Hernandez M.D.    24 minutes spent on the date of the encounter on chart review, video visit, documentation and further activities as noted above.         Again, thank you for allowing me to participate in the care of your patient.      Sincerely,    Lukas Hernandez MD

## 2025-02-05 NOTE — PROGRESS NOTES
Virtual Visit Details    Type of service:  Video Visit     Originating Location (pt. Location): Home    Distant Location (provider location):  On-site  Platform used for Video Visit: [employer]

## 2025-02-05 NOTE — Clinical Note
"Please change therapy plan regarding \"boost\" as per patient instructions, and arrange a return video visit."

## 2025-02-05 NOTE — PATIENT INSTRUCTIONS
"Plan is as follows:    Continue current IVIg dose of 100 gms every 2 weeks.  Change the \"boost\" IVIg to 50 gms every 4 months.  Infectious disease consultation was ordered in October but did not occur. I will re-order it. The purpose is to review the risks of Rituximab without further vaccination and to guide us regarding labs to obtain prior to possible Rituximab treatment (per Dr Hung's note from 2023).  Return video visit 4 months, in-person every 1-2 years.    Please call Ruth Ann @ [phone] for questions or concerns during regular business hours. For a more efficient way to communicate, use [employer] and address the message to your physician. Remember, [employer] is only read during business hours. Do not leave urgent messages on [employer] or [employer]. If situation is urgent, contact the Neurology Clinic @ [phone] and ask to speak to a Triage Nurse or Call 911 or visit an Emergency Department.     Please call your pharmacy if you need a medication refill. They will send us an electronic message.   "

## 2025-02-05 NOTE — NURSING NOTE
Current patient location: [address] [place] [id number]    Is the patient currently in the state of [place]? YES    Visit mode: VIDEO    If the visit is dropped, the patient can be reconnected by:TELEPHONE VISIT: Phone number:   Telephone Information:   Mobile [phone]       Will anyone else be joining the visit? NO  (If patient encounters technical issues they should call [phone])    Are changes needed to the allergy or medication list? Pt stated no med changes    Are refills needed on medications prescribed by this physician? NO    Rooming Documentation:  Questionnaire(s) completed    Reason for visit: BRETT KELLYF

## 2025-02-05 NOTE — Clinical Note
Did not receive last boost until end of November or early December. Currently scheduled for another boost on Tuesday. This should be moved back to end of March or early April.

## 2025-02-08 NOTE — PROGRESS NOTES
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 Last fall he had a characteristic exacerbation of symptoms without evident precipitant. We provided an additional 50 gms IVIg, and he reports complete resolution of symptoms subsequent to that. R-ODS is now back to 50.    We discussed the following:    His recent experience suggests that his current IVIg dosing regimen may be slightly inadequate. I suggested increasing to 120 gms every 2 weeks or making the additional 50 gm boost slightly more frequent (every 4 months), and he chooses the latter as 120 mg would be more difficult to administer logistically.   We reviewed the delay in obtaining the additional 50 gms last fall. It was because his insurance company required a prior authorization to change the dosing schedule.  We discussed Rituximab. When he was previously treated with Rituximab there was a measurable reduction in IVIg needs. He has had two I.D. consultations regarding this to assess risks in the context of not having vaccination for COVID. We discussed the fact that during the pandemic there was appropriate caution; risk remains but may be deemed lower currently for several reasons. I personally reviewed the most recent I.D. consultation, with the following conclusion and recommendations: Chilo still does not want to receive the COVID vaccine and he tells me that he will defer the rituximab treatment for now. I explained that, if he and Dr. Hernandez decide to re-consider the rituximab treatment in the future, our team will need to be contacted because additional tests will need to be performed such as: HIV, Hepatitis C, quantiferon and HBV DNA (he is immune for hepatitis B but has hepatitis H c antibody positive and hepatitis B prophylaxis would need to be considered if plan to start rituximab). ANother test that might be considered is baseline YOCASTA virus serology.   Mr Moran is interested in another course of Rituximab, so in keeping with I.D. recommendations I  "have entered a request for a follow up visit. In this context, he indicated the sense that he is being denied treatment for his condition. I explained that he is being prescribed the recognized effective therapy for MMN and that I am following infectious disease recommendations, which I feel are medically appropriate.         Neuropathy Assessments      2/3/2025     8:47 AM 10/28/2024     7:49 PM 10/15/2024     2:28 PM 6/16/2024     8:24 PM 2/27/2024     8:00 AM 8/22/2023     8:00 AM 2/28/2023     9:00 AM   Neurology Assessments   RODS MMN Score 50  44  47 50 50 50 0       Patient-reported         10/29/2024    11:00 AM 8/22/2023     9:00 AM 5/9/2022     4:29 PM 6/22/2021     8:29 AM    Strength:   Right hand strength in lbs: 72 68 81 81   Left hand strength in lbs: 79 66 85 92         10/29/2024    10:00 AM 8/22/2023     9:00 AM 5/9/2022     4:00 PM 6/22/2021     8:00 AM   Immunotherapy   Time since last IVIG (days): 2 1 8 9 days   Current treatment: IVIg 100 gm every 2 weeks, 50 gms every 6 months 100 g every 2 wks 100 gms IVIg every 2 weeks 90 gms IVIg q2wks        Recommendations [copied from patient instructions]:    Plan is as follows:    Continue current IVIg dose of 100 gms every 2 weeks.  Change the \"boost\" IVIg to 50 gms every 4 months.  Infectious disease consultation was ordered in October but did not occur. I will re-order it. The purpose is to review the risks of Rituximab without further vaccination and to guide us regarding labs to obtain prior to possible Rituximab treatment (per Dr Hung's note from 2023).  Return video visit 4 months, in-person every 1-2 years.      Lukas Hernandez M.D.    24 minutes spent on the date of the encounter on chart review, video visit, documentation and further activities as noted above.       "

## 2025-02-10 ENCOUNTER — TELEPHONE (OUTPATIENT)
Dept: NEUROSURGERY | Facility: CLINIC | Age: 70
End: 2025-02-10
Payer: COMMERCIAL

## 2025-02-10 NOTE — TELEPHONE ENCOUNTER
Lukas Hernandez MD Diaz, Micheline HUYNH RN  Did not receive last boost until end of November or early December. Currently scheduled for another boost on Tuesday. This should be moved back to end of March or early April.    His boost dose has been changed to 50g once every 4 months. I have updated [facility] and they will be faxing a form for Dr Hernandez to sign. Therapy plan has been updated and sent to Dr Hernandez to sign.    The pt is aware of this plan.     Micheline Peters RN Care Coordinator   Neurology/Neurosurgery/PM&R/ Pain Management

## 2025-03-20 NOTE — PROGRESS NOTES
GENERAL ID CLINIC           Assessment and Recommendations:   Problem List:    # Motor neuropathy     Discussion:     Mr. 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 Lukas Hernandez). Patient is on IVIG treatment. Per Dr. Hernandez's note, the patient had a flare with deterioration with weakness of proximal lower limb and the IVIG dose was increased. Dr. Hernandez is considering initiation of rituximab because, per Dr. Hernandez, this was associated with a reduction in IVIG dependence when used previously.  Dr. Hernandez referred the patient for ID pre-evaluation in anticipation for rituximab treatment, specificaly to determine risk of COVID since the patient was never vaccinated for COVID (refused).      Patient states that his neurologic symptoms are improving after the increased dose of IVIG. He had his port replaced yesterday (9/18/23) and he is infusing the IVIG through the port. The patient specifically asked me about the risks of developing COVID without receiving the vaccine. He also tells me that in the past he had antibodies for COVID checked an they were positive. I explained that with the new strains of COVID the antibodies he produces would likely not be active anymore. For that reason the CDC released a new COVID vaccine. I explained that ritiuximab will act on the B lymphocytes which are the immune cells that produce antibodies. The ability to produce antibodies is essential for the development of adequate response to COVID. I explained that it would be ideal if he received the COVID vaccine 2 weeks or longer before the initiation of rituximab. I explained that, without receiving the COVID vaccine the initiation of rituximab will significantly increase the chance of severe COVID disease if he happens to become infected. He asked me about the side effectes of the COVOD vaccine and I explained that the most common are pain and inflammations at the site of injection  which goes away in 2-3 days; flu like symptoms which goes away in 2-3 days. The severe side effects (Margaret Harrod syndrome, myocarditis or thrombotic events) are extremely rare according to CDC. I explained that in the past we used to have pre-formed monoclonal antibodies for pre-exposure prophylaxis in immuncompromised patiens ([facility]) but we still do not have this type of treatment for the current strains of COVID yet (according to the COVID IDSA guidelines). I also explained that it will be important to receive the flu vaccine and RSV vaccine at least 2 weeks before immunosuppression.  Chilo still does not want to receive the COVID vaccine and he tells me that he will defer the rituximab treatment for now. I explained that, if he and Dr. Hernandez decide to re-consider the rituximab treatment in the future, our team will need to be contacted because additional tests will need to be performed such as: HIV, Hepatitis C, quantiferon and HBV DNA (he is immune for hepatitis B but has hepatitis H c antibody positive and hepatitis B prophylaxis would need to be considered if plan to start rituximab). ANother test that might be considered is baseline YOCASTA virus serology    Patient now comes in because he would like to be started on rituximab. He continues to deny COVID and flu vaccinations. He is agreeeable with having the following tests: HIV, Hepatitis C, quantiferon and HBV DNA (he is immune for hepatitis B but has hepatitis B core antibody positive and hepatitis B prophylaxis would need to be considered if plan to start rituximab). One test that might be considered is baseline YOCASTA virus serology.      Recommendations:    I will order the following tests: HIV, Hepatitis C, quantiferon and HBV DNA (he is immune for hepatitis B but has hepatitis B core antibody positive and hepatitis B prophylaxis would need to be considered if plan to start rituximab). One test that might be considered is baseline YOCASTA virus serology.          - He would like the tests to be done at [facility] lab located ([address], [place] [address]). I will ask clinic team to fax the lab tests  Patient will most likely need to be started in hep B prophylaxis (entecavir) if rituximab is started  Patient continues to refuse COVID and flu vaccine  Pemivibart is a monoclonal antibody medication used for the pre-exposure prophylaxis (PEP) of COVID-19 in individuals who are moderately to severely immunocompromised and have an inadequate response to COVID-19 vaccination. It was authorized for emergency use by the U.S. Food and Drug Administration (FDA) in March 2024. This could potentially be an option for COVID prophylaxis is approved by insurance. Dose however needs to be repeated every 3 months. I will explore this possibility if the patient agrees      Recommendations discussed with the patient.      Ashley Hung MD  Date of Service: 03/25/25    On 03/25/25, I spent 40 min with chart review, patient visit, documentation and coordination of care.           History of Present Illness:   Mr. 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 Lukas Hernandez). Patient is on IVIG treatment. Per Dr. Hernandez's note, the patient had a flare with deterioration with weakness of proximal lower limb and the IVIG dose was increased. Dr. Hernandez is considering initiation of rituximab because, per Dr. Hernandez, this was associated with a reduction in IVIG dependence when used previously.  Dr. Hernandez referred the patient for ID pre-evaluation in anticipation for rituximab treatment, specificaly to determine risk of COVID since the patient wss never vaccinated for COVID (refused).      Today's visit:     Patient states that his neurologic symptoms are improving after the increased dose of IVIG. He had his port replaced yesterday (9/18/23) and he is infusing the IVIG through the port. The patient specifically asked me about the risks of  developing COVID without receiving the vaccine. He also tells me that oin the past he had antibodies for COVID checked an they were positive. I explained that with the new strains of COVID the antibodies he produces in the past would likely not be active anymore. Fir that reason the [facility] released a new COVID vaccine. I explained that ritiximab will act on the B lymphocytes which are the immune cells that produce antibodies. The ability to produce antibodies is essential for the development of adequate response to COVID. I explained that it would be ideal if he received the COVID vaccine 2 weeks of longer before the initiation of rituximab. I explained that, without receiving the COVID vaccine the initiation of rituximab will significantly increase the chance of severe COVID disease if he happens to become infected. He asked me abput the side effectes of the COVOD vaccine and I explained that the most common are pain and inflammations at the site of injection which goes away in 2-3 days; flu like symptoms which goes away in 2-3 days. The severe side effects (Margaret Solvang syndrome, myocarditis or thrombotic events are extremely rare according to [facility]). I explained that in the past we used to have pre-formed monoclonal antibodies for pre-exposure prophylaxis in immuncompromised patiens ([facility]) but we still do not have this type of treatment for the current strains of COVID yet. I also explained that it will be important to receive the flu vaccine and RSV vaccine at least 2 weeks before immunoisuppression.  Dennies still does not want to receive the COVID vaccine and he tells me that he will defer the rituximab treatment for now. I explained that, if he and Dr. Hernandez decide to re-consider the rituximab treatment in the future we will need to be contacted because additional tests will need to be performed such as: HIV, Hepatitis C, quantiferon and HBV DNA (he is immune for hepatitis B but has hepatitis B core antibody  positive and hepatitis B prophylaxis would need to be considered if plan to start rituximab). One tests that might be considered is baseline YOCASTA virus serology    Patient now comes in because he would like to be started on rituximab. He continues to deny COVID and flu vaccinations. He is agreeeable with having the following tests: HIV, Hepatitis C, quantiferon and HBV DNA (he is immune for hepatitis B but has hepatitis B core antibody positive and hepatitis B prophylaxis would need to be considered if plan to start rituximab). One test that might be considered is baseline YOCASTA virus serology.                Review of Systems:     CONSTITUTIONAL:  No fevers or chills  INTEGUMENTARY/SKIN: NEGATIVE for worrisome rashes, moles or lesions  EYES: Negative for icterus, vision changes or irritation  ENT/MOUTH:  Negative for oral lesions and sore throat  RESPIRATORY:  Negative for cough and dyspnea  CARDIOVASCULAR:  Negative for chest pain, palpitations and  shortness of breath  GASTROINTESTINAL:  Negative for abdominal pain, nausea, vomiting, diarrhea and constipation  GENITOURINARY:  Negative for dysuria, hematuria, frequency and urgency  MUSCULOSKELETAL: Negative for joint pain, swelling, motion restriction, negative for musculoskeletal pain  NEURO:  See HPI  PSYCHIATRIC: Negative for changes in mood or affect  HEMATOLOGIC/LYMPHATIC: negative for lymphadenopathy or bleeding  ALLERGIC/IMMUNOLOGIC: Negative for allergic reaction   ENDOCRINE: Negative for temperature intolerance, skin/hair changes        Past Medical History:     See HPI    Had 3 port placements in the past 20 years for IVIG treatment       Allergies:       No Known Allergies          Family History:     Family History   Problem Relation Age of Onset    Diabetes Mother     Coronary Artery Disease Father     Hyperlipidemia Father     Cerebrovascular Disease No family hx of     Breast Cancer No family hx of     Colon Cancer No family hx of     Prostate Cancer No  family hx of     Thyroid Disease No family hx of          Social History:     Social History     Socioeconomic History    Marital status:      Spouse name: Not on file    Number of children: Not on file    Years of education: Not on file    Highest education level: Not on file   Occupational History    Not on file   Tobacco Use    Smoking status: Former    Smokeless tobacco: Never   Substance and Sexual Activity    Alcohol use: Not on file    Drug use: Not on file    Sexual activity: Not on file   Other Topics Concern    Parent/sibling w/ CABG, MI or angioplasty before 65F 55M? Not Asked   Social History Narrative    Not on file     Social Drivers of Health     Financial Resource Strain: Not on file   Food Insecurity: Not on file   Transportation Needs: Not on file   Physical Activity: Not on file   Stress: Not on file (11/6/2024)   Social Connections: Not on file   Interpersonal Safety: Low Risk  (4/16/2021)    Received [facility]    Intimate Partner Violence     Insults You: Not on file     Threatens You: Not on file     Screams at You: Not on file     Physically Hurt: Not on file     Intimate Partner Violence Score: Not on file   Housing Stability: Not on file      HOME/ENVIRONMENT:   Lives with wife     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

## 2025-03-25 ENCOUNTER — VIRTUAL VISIT (OUTPATIENT)
Dept: INFECTIOUS DISEASES | Facility: CLINIC | Age: 70
End: 2025-03-25
Attending: INTERNAL MEDICINE
Payer: COMMERCIAL

## 2025-03-25 VITALS — BODY MASS INDEX: 21.47 KG/M2 | WEIGHT: 150 LBS | HEIGHT: 70 IN

## 2025-03-25 DIAGNOSIS — G61.82 MMN (MULTIFOCAL MOTOR NEUROPATHY) (H): ICD-10-CM

## 2025-03-25 ASSESSMENT — PAIN SCALES - GENERAL: PAINLEVEL_OUTOF10: NO PAIN (0)

## 2025-03-25 NOTE — LETTER
3/25/2025       RE: Chilo Moran  [address] [facility] [address]     Dear Colleague,    Thank you for referring your patient, Chilo Moran, to the [facility] INFECTIOUS DISEASE CLINIC [place] at [facility]. Please see a copy of my visit note below.       GENERAL ID CLINIC           Assessment and Recommendations:   Problem List:    # Motor neuropathy     Discussion:     Mr. 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 Lukas Hernandez). Patient is on IVIG treatment. Per Dr. Hernandez's note, the patient had a flare with deterioration with weakness of proximal lower limb and the IVIG dose was increased. Dr. Hernandez is considering initiation of rituximab because, per Dr. Hernandez, this was associated with a reduction in IVIG dependence when used previously.  Dr. Hernandez referred the patient for ID pre-evaluation in anticipation for rituximab treatment, specificaly to determine risk of COVID since the patient was never vaccinated for COVID (refused).      Patient states that his neurologic symptoms are improving after the increased dose of IVIG. He had his port replaced yesterday (9/18/23) and he is infusing the IVIG through the port. The patient specifically asked me about the risks of developing COVID without receiving the vaccine. He also tells me that in the past he had antibodies for COVID checked an they were positive. I explained that with the new strains of COVID the antibodies he produces would likely not be active anymore. For that reason the CDC released a new COVID vaccine. I explained that ritiuximab will act on the B lymphocytes which are the immune cells that produce antibodies. The ability to produce antibodies is essential for the development of adequate response to COVID. I explained that it would be ideal if he received the COVID vaccine 2 weeks or longer before the initiation of rituximab. I explained  that, without receiving the COVID vaccine the initiation of rituximab will significantly increase the chance of severe COVID disease if he happens to become infected. He asked me about the side effectes of the COVOD vaccine and I explained that the most common are pain and inflammations at the site of injection which goes away in 2-3 days; flu like symptoms which goes away in 2-3 days. The severe side effects (Margaret Bellingham syndrome, myocarditis or thrombotic events) are extremely rare according to CDC. I explained that in the past we used to have pre-formed monoclonal antibodies for pre-exposure prophylaxis in immuncompromised patiens ([facility]) but we still do not have this type of treatment for the current strains of COVID yet (according to the COVID IDSA guidelines). I also explained that it will be important to receive the flu vaccine and RSV vaccine at least 2 weeks before immunosuppression.  Chilo still does not want to receive the COVID vaccine and he tells me that he will defer the rituximab treatment for now. I explained that, if he and Dr. Hernandez decide to re-consider the rituximab treatment in the future, our team will need to be contacted because additional tests will need to be performed such as: HIV, Hepatitis C, quantiferon and HBV DNA (he is immune for hepatitis B but has hepatitis H c antibody positive and hepatitis B prophylaxis would need to be considered if plan to start rituximab). ANother test that might be considered is baseline YOCASTA virus serology    Patient now comes in because he would like to be started on rituximab. He continues to deny COVID and flu vaccinations. He is agreeeable with having the following tests: HIV, Hepatitis C, quantiferon and HBV DNA (he is immune for hepatitis B but has hepatitis B core antibody positive and hepatitis B prophylaxis would need to be considered if plan to start rituximab). One test that might be considered is baseline YOCASTA virus serology.       Recommendations:    I will order the following tests: HIV, Hepatitis C, quantiferon and HBV DNA (he is immune for hepatitis B but has hepatitis B core antibody positive and hepatitis B prophylaxis would need to be considered if plan to start rituximab). One test that might be considered is baseline YOCASTA virus serology.         - He would like the tests to be done at [facility] lab located ([facility] [address], [place] [address]). I will ask clinic team to fax the lab tests  Patient will most likely need to be started in hep B prophylaxis (entecavir) if rituximab is started  Patient continues to refuse COVID and flu vaccine  Pemivibart is a monoclonal antibody medication used for the pre-exposure prophylaxis (PEP) of COVID-19 in individuals who are moderately to severely immunocompromised and have an inadequate response to COVID-19 vaccination. It was authorized for emergency use by the U.S. Food and Drug Administration (FDA) in March 2024. This could potentially be an option for COVID prophylaxis is approved by insurance. Dose however needs to be repeated every 3 months. I will explore this possibility if the patient agrees      Recommendations discussed with the patient.      Ashley Hung MD  Date of Service: 03/25/25    On 03/25/25, I spent 40 min with chart review, patient visit, documentation and coordination of care.           History of Present Illness:   Mr. 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 Lukas Hernandez). Patient is on IVIG treatment. Per Dr. Hernandez's note, the patient had a flare with deterioration with weakness of proximal lower limb and the IVIG dose was increased. Dr. Hernandez is considering initiation of rituximab because, per Dr. Hernandez, this was associated with a reduction in IVIG dependence when used previously.  Dr. Hernandez referred the patient for ID pre-evaluation in anticipation for rituximab treatment, specificaly to determine risk of  COVID since the patient wss never vaccinated for COVID (refused).      Today's visit:     Patient states that his neurologic symptoms are improving after the increased dose of IVIG. He had his port replaced yesterday (9/18/23) and he is infusing the IVIG through the port. The patient specifically asked me about the risks of developing COVID without receiving the vaccine. He also tells me that oin the past he had antibodies for COVID checked an they were positive. I explained that with the new strains of COVID the antibodies he produces in the past would likely not be active anymore. Fir that reason the [facility] released a new COVID vaccine. I explained that ritiximab will act on the B lymphocytes which are the immune cells that produce antibodies. The ability to produce antibodies is essential for the development of adequate response to COVID. I explained that it would be ideal if he received the COVID vaccine 2 weeks of longer before the initiation of rituximab. I explained that, without receiving the COVID vaccine the initiation of rituximab will significantly increase the chance of severe COVID disease if he happens to become infected. He asked me abput the side effectes of the COVOD vaccine and I explained that the most common are pain and inflammations at the site of injection which goes away in 2-3 days; flu like symptoms which goes away in 2-3 days. The severe side effects (Margaret Portland syndrome, myocarditis or thrombotic events are extremely rare according to CDC). I explained that in the past we used to have pre-formed monoclonal antibodies for pre-exposure prophylaxis in immuncompromised patiens ([facility]) but we still do not have this type of treatment for the current strains of COVID yet. I also explained that it will be important to receive the flu vaccine and RSV vaccine at least 2 weeks before immunoisuppression.  Dennies still does not want to receive the COVID vaccine and he tells me that he will defer  the rituximab treatment for now. I explained that, if he and Dr. Hernandez decide to re-consider the rituximab treatment in the future we will need to be contacted because additional tests will need to be performed such as: HIV, Hepatitis C, quantiferon and HBV DNA (he is immune for hepatitis B but has hepatitis B core antibody positive and hepatitis B prophylaxis would need to be considered if plan to start rituximab). One tests that might be considered is baseline YOCASTA virus serology    Patient now comes in because he would like to be started on rituximab. He continues to deny COVID and flu vaccinations. He is agreeeable with having the following tests: HIV, Hepatitis C, quantiferon and HBV DNA (he is immune for hepatitis B but has hepatitis B core antibody positive and hepatitis B prophylaxis would need to be considered if plan to start rituximab). One test that might be considered is baseline YOCASTA virus serology.                Review of Systems:     CONSTITUTIONAL:  No fevers or chills  INTEGUMENTARY/SKIN: NEGATIVE for worrisome rashes, moles or lesions  EYES: Negative for icterus, vision changes or irritation  ENT/MOUTH:  Negative for oral lesions and sore throat  RESPIRATORY:  Negative for cough and dyspnea  CARDIOVASCULAR:  Negative for chest pain, palpitations and  shortness of breath  GASTROINTESTINAL:  Negative for abdominal pain, nausea, vomiting, diarrhea and constipation  GENITOURINARY:  Negative for dysuria, hematuria, frequency and urgency  MUSCULOSKELETAL: Negative for joint pain, swelling, motion restriction, negative for musculoskeletal pain  NEURO:  See HPI  PSYCHIATRIC: Negative for changes in mood or affect  HEMATOLOGIC/LYMPHATIC: negative for lymphadenopathy or bleeding  ALLERGIC/IMMUNOLOGIC: Negative for allergic reaction   ENDOCRINE: Negative for temperature intolerance, skin/hair changes        Past Medical History:     See HPI    Had 3 port placements in the past 20 years for IVIG treatment        Allergies:       No Known Allergies          Family History:     Family History   Problem Relation Age of Onset     Diabetes Mother      Coronary Artery Disease Father      Hyperlipidemia Father      Cerebrovascular Disease No family hx of      Breast Cancer No family hx of      Colon Cancer No family hx of      Prostate Cancer No family hx of      Thyroid Disease No family hx of          Social History:     Social History     Socioeconomic History     Marital status:      Spouse name: Not on file     Number of children: Not on file     Years of education: Not on file     Highest education level: Not on file   Occupational History     Not on file   Tobacco Use     Smoking status: Former     Smokeless tobacco: Never   Substance and Sexual Activity     Alcohol use: Not on file     Drug use: Not on file     Sexual activity: Not on file   Other Topics Concern     Parent/sibling w/ CABG, MI or angioplasty before 65F 55M? Not Asked   Social History Narrative     Not on file     Social Drivers of Health     Financial Resource Strain: Not on file   Food Insecurity: Not on file   Transportation Needs: Not on file   Physical Activity: Not on file   Stress: Not on file (11/6/2024)   Social Connections: Not on file   Interpersonal Safety: Low Risk  (4/16/2021)    Received from 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 Location): Home    Distant Location (provider location):  Off-site  Platform used for Video Visit: Jesus      Again, thank you for allowing me to participate in the care of your patient.      Sincerely,    ZONIA RICHARDSON MD

## 2025-03-25 NOTE — PROGRESS NOTES
Sent results via [employer]. Script sent to preferred pharmacy per PCP direction    Virtual Visit Details    Type of service:  Video Visit had to be chanhed to telephone visit given technical difficulties    Originating Location (pt. Location): Home    Distant Location (provider location):  Off-site  Platform used for Video Visit: Thumb Reading

## 2025-03-25 NOTE — NURSING NOTE
Current patient location: [address] [place] [id number]    Is the patient currently in the state of [place]? YES    Visit mode: VIDEO    If the visit is dropped, the patient can be reconnected by:VIDEO VISIT: Text to cell phone:   Telephone Information:   Mobile [phone]       Will anyone else be joining the visit? NO  (If patient encounters technical issues they should call [phone]    Are changes needed to the allergy or medication list? No    Are refills needed on medications prescribed by this physician? NO    Rooming Documentation:  Not applicable    Reason for visit: BRETT KELLYF

## 2025-03-26 ENCOUNTER — TELEPHONE (OUTPATIENT)
Dept: INFECTIOUS DISEASES | Facility: CLINIC | Age: 70
End: 2025-03-26
Payer: COMMERCIAL

## 2025-03-26 NOTE — TELEPHONE ENCOUNTER
Lab orders faxed to [facility]: [phone].    [place] Lauren,KAYLA  Rheumatology & ID  [address] [place] [address] [phone]

## 2025-03-26 NOTE — TELEPHONE ENCOUNTER
----- Message from ASHLEY RICHARDSON sent at 3/25/2025 10:13 PM CDT -----  Dear all,    Please fax the tests I placed today to the following lab:    [facility]  ([address], [place] [address])    Thank you    Ashley

## 2025-04-15 ENCOUNTER — MYC MEDICAL ADVICE (OUTPATIENT)
Dept: NEUROLOGY | Facility: CLINIC | Age: 70
End: 2025-04-15
Payer: COMMERCIAL

## 2025-04-17 NOTE — CONFIDENTIAL NOTE
RN called [facility] to speak with a pharmacist. I was informed that a PA needs to be submitted because of the change in interval (changed from every 6 months to every 4 months at the last visit). We sent in these orders back on 2/14/25.     The PA specialist will submit a form requesting that a emergency dose be shipped out to his home while they work on the PA. They will be in touch once it gets approved.    The pt was informed of the plan and he was appreciative of the assistance.    Micheline Peters RN Care Coordinator   Neurology/Neurosurgery/PM&R/ Pain Management

## 2025-04-20 DIAGNOSIS — G61.82 MMN (MULTIFOCAL MOTOR NEUROPATHY) (H): Primary | ICD-10-CM

## 2025-04-21 NOTE — TELEPHONE ENCOUNTER
Ralph called writer back with an update. Per Ralph, a PA was submitted and they received a status update from [facility] that the insurance company would be reaching out to Dr. Hernandez's office for clinical information before approving/denying the booster dose. She provided writer with the PA #[id number]. Our office has not yet received a call from [employer].    Patient has been updated via [employer].      MADELAINE Castro RN Care Coordinator  Neurology/Neurosurgery/PM&R/Pain Management

## 2025-04-21 NOTE — TELEPHONE ENCOUNTER
RN called [facility] and spoke with one of their staff, Ralph. After 95 minutes on the call they were not able to verify wether a PA was submitted for patient's boost dose yet.  Micheline RN spoke with [facility] last week and was told that a PA was going to be started/submitted on 4/17. The person I spoke with explained that on her end it doesn't look like anyone did this, but she was waiting on the PA supervisor to get back to her.    Patient's regular maintenance dose of Gammagard is scheduled to be sent to patient tomorrow 4/22. The emergency shipment for his boost dose was cancelled due to lack of PA on Friday 4/17.     [employer] is supposed to be calling writer back by 4:30 pm today with an update on the PA/medication delivery status. Will plan to update the patient then.      JUSTIN CastroN RN Care Coordinator  Neurology/Neurosurgery/PM&R/Pain Management

## 2025-04-22 ENCOUNTER — TELEPHONE (OUTPATIENT)
Dept: INFECTIOUS DISEASES | Facility: CLINIC | Age: 70
End: 2025-04-22
Payer: COMMERCIAL

## 2025-04-22 NOTE — TELEPHONE ENCOUNTER
RN received a fax from [facility] requesting the following additional clinic information:    - confirmation of the date when the patient last received the booster dose of 50 g (12/1/24)  - provide the date for next booster dose (due on 4/1/25, requesting dose ASAP)    RN returned call to the Clinical Review department and provided this information to Darling DAVIS, pharmacist. Darling explained that patient's insurance policy does not support a booster dose every 4 months therefor they cannot approve the PA. Dr. Hernandez is willing to do a P2P and this has been scheduled for Tuesday 4/29. A provider from [employer] will call the clinic sometime between 7:30-8:30 am and 12:00 pm -1:00 to speak with Dr. Hernandez. Dr. Hernandez is aware.      JUSTIN CastroN RN Care Coordinator  Neurology/Neurosurgery/PM&R/Pain Management

## 2025-04-24 ENCOUNTER — VIRTUAL VISIT (OUTPATIENT)
Dept: PHARMACY | Facility: CLINIC | Age: 70
End: 2025-04-24
Attending: PSYCHIATRY & NEUROLOGY
Payer: COMMERCIAL

## 2025-04-24 DIAGNOSIS — E78.5 HYPERLIPIDEMIA LDL GOAL <100: ICD-10-CM

## 2025-04-24 DIAGNOSIS — G61.82 MMN (MULTIFOCAL MOTOR NEUROPATHY) (H): Primary | ICD-10-CM

## 2025-04-24 DIAGNOSIS — Z78.9 TAKES DIETARY SUPPLEMENTS: ICD-10-CM

## 2025-04-24 DIAGNOSIS — I10 BENIGN ESSENTIAL HYPERTENSION: ICD-10-CM

## 2025-04-24 DIAGNOSIS — Z20.5 EXPOSURE TO HEPATITIS B: ICD-10-CM

## 2025-04-24 RX ORDER — LISINOPRIL AND HYDROCHLOROTHIAZIDE 12.5; 2 MG/1; MG/1
1 TABLET ORAL
COMMUNITY
Start: 2025-03-21

## 2025-04-24 NOTE — Clinical Note
Kourtney,  I met with Black today to help start rituximab. Dr. Hernandez I sent you the orders in another encounter.  Dr. Price - Do you want him to complete anymore of the Hep b seriologies prior to starting rituximab or wait until after a month of taking the entecavir?   Thanks!

## 2025-04-24 NOTE — PATIENT INSTRUCTIONS
"Recommendations from today's MTM visit:                                                    MTM (medication therapy management) is a service provided by a clinical pharmacist designed to help you get the most of out of your medicines.        Plan to start rituximab 500mg on day 1 and 15.   Possible side effects include infusion reaction and increased risk of infection (UTI and upper respiratory infection)   Orders will be faxed to [facility] infusion center  Continue with current IVIG dose for now  Please start entecavir on the day of your rituximab. Please let me know if there are any issues filling this medication  If you blood pressure continues to be above 130/80, recommend following up with your primary care provider     Follow-up: 3-6 months.     It was great speaking with you today.  I value your experience and would be very thankful for your time in providing feedback in our clinic survey. In the next few days, you may receive an email or text message from [employer] with a link to a survey related to your  clinical pharmacist.\"     To schedule another MTM appointment, please call the clinic directly or you may call the MTM scheduling line at [phone] or toll-free at [phone].     My Clinical Pharmacist's contact information:                                                      Please feel free to contact me with any questions or concerns you have.      Shannan Kelley, PharmD, BCACP  Medication Therapy Management Pharmacist   [facility]     "

## 2025-04-24 NOTE — PROGRESS NOTES
Medication Therapy Management (MTM) Encounter    ASSESSMENT:                            Medication Adherence/Access: No issues identified.    MMN  Patient may benefit from starting rituximab infusions as recommended by neurologist. The recommended dose would be 500mg for two doses on day 1 and 15. Baseline labs reviewed and may still need to complete hepatitis b serologies. Patient will be started on entecavir per ID recommendation due to past labs being reactive. Will follow-up with ID regarding labs. Education provided to the patient on medication dosing and potential side effects as well as the infusion process. Of note, patient prefers to infuse at [facility]. Patient will continue with current IVIG regimen for now with the hopes of reducing the dose once rituximab is started.     Past Hep B exposure   Advised patient to call pharmacy to see if medication is ready for him or if there is anything extra that needs to be completed for insurance to approve medication. Patient will need to start this medication when he starts rituximab infusions. Infectious disease provider has recommended to repeat hepatitis b serologies. Will clarify when she would like these completed next. ID provider has recommended patient continue on entecavir for 6 months after last dose of rituximab.     Hypertension   Reported blood pressure above goal of less than 130/80. Patient may benefit following up with primary care provider.      Hyperlipidemia   LDL at goal of less than 100mg/dl. Continue with current statin regimen.      Supplements   Stable    PLAN:                            Plan to start rituximab 500mg on day 1 and 15.   Possible side effects include infusion reaction and increased risk of infection (UTI and upper respiratory infection)   Orders will be faxed to [facility] infusion [place]  Continue with current IVIG dose for now  Please start entecavir on the day of your rituximab. Please let me know if there are  any issues filling this medication  If you blood pressure continues to be above 130/80, recommend following up with your primary care provider     Follow-up: 3-6 months.     SUBJECTIVE/OBJECTIVE:                          Black Moran is a 70 year old male seen for an initial visit. He was referred to me from Dr. Hernandez.      Reason for visit: Rituximab infusion education and initiation.    Allergies/ADRs: Reviewed in chart  Past Medical History: Reviewed in chart  Tobacco: He reports that he has quit smoking. He has never used smokeless tobacco.  Alcohol: not currently using    Medication Adherence/Access: no issues reported.    MMN  - IVIG 100 grams every 14 days + a 50 gram booster dose every 4 month.   Patient self infuses his  own IVIG. Last was on Sunday the 13th. [facility] has been sending him his IVIG. Recently with the booster doses he gets, [facility] has been an issue. Patient is hopeful that the booster doses will be eliminated and the IVIG every 2 week dose being reduced.   Has received Rituximab in the past but can't remember how long ago it was. He did tolerate the infusions. He did two doses 2 weeks apart. He was then able to have a much lower dose of IVIG dose. Following his rituximab infusions, he denies any infections   He prefers to go to [facility] infusion center      Baseline screenings: Patient has labs completed however not all hepatitis serologies were drawn. His infection disease provider still would like these labs completed   Hep B surface antibody reactive   Hep B surface antigen Not completed - this was nonreactive in 2/2020  Hep B core antibody Not completed - this was reactive in 2/2020  HCV negative   HIV Negative  TB  negative   CBC with differential completed 10/17/2024  Hepatic panel completed 10/17/2024      Past Hep B exposure   Entecavir 0.5mg daily   hasn't picked this up from the pharmacy yet and hasn't started yet. He will start until he knows the rituximab is approved and he can start  that     Hypertension   -Lisinopril-hydrochlorothiazide 20-12.5mg   Patient reports no current medication side effects  Patient self monitors blood pressure.  Home BP monitoring 140/70-80's .        Hyperlipidemia   -Atorvastatin 40mg daily  -Aspirin 81mg daily   Patient reports no significant myalgias or other side effects.       Supplements   -vitamin C 500mg daily   -multivitamin daily   No reported issues at this time.      Today's Vitals: There were no vitals taken for this visit.  ----------------      I spent 21 minutes with this patient today. I offer these suggestions for consideration by Dr. Hernandez.     A summary of these recommendations was sent via [employer].    Shannan Kelley, PharmD, BCACP  Medication Therapy Management Pharmacist   [facility]      Telemedicine Visit Details  The patient's medications can be safely assessed via a telemedicine encounter.  Type of service:  Telephone visit  Originating Location (pt. Location): Home  Distant Location (provider location):  On-site  Start Time:  2:00PM  End Time: 2:21 PM     Medication Therapy Recommendations  No medication therapy recommendations to display

## 2025-04-28 ENCOUNTER — MYC MEDICAL ADVICE (OUTPATIENT)
Dept: NEUROLOGY | Facility: CLINIC | Age: 70
End: 2025-04-28
Payer: COMMERCIAL

## 2025-04-28 NOTE — TELEPHONE ENCOUNTER
Called patient per his [employer] request. Patient is wondering if he can infuse at an infusion center in [place] which would be closer to his home. Explained we would need to find an infusion center that would accept orders from an outside provider and would be able to do rituximab infusions. He requests this be looked into and then provide an update through [employer].     Called [facility] in [place]. Spoke with Hilary and she states they would need the therapy plan, insurance information, demographics, and chart notes. She requested all this be faxed to [phone].     Will update patient with this information.     Shannan Kelley, PharmD, BCACP  Medication Therapy Management Pharmacist   [facility]

## 2025-04-28 NOTE — TELEPHONE ENCOUNTER
Rituximab therapy orders, ADT face sheet & most recent MTM & neurology office visit notes have been faxed to the [facility] ATTN: Hilary at fax # [phone]. Confirmation of successful fax delivery received.      JUSTIN CastroN RN Care Coordinator  Neurology/Neurosurgery/PM&R/Pain Management

## 2025-05-06 ENCOUNTER — MYC MEDICAL ADVICE (OUTPATIENT)
Dept: NEUROLOGY | Facility: CLINIC | Age: 70
End: 2025-05-06
Payer: COMMERCIAL

## 2025-05-07 NOTE — TELEPHONE ENCOUNTER
Yazmin from [facility] called to tell writer they did not get the referral. Got OV notes but no referral or pertinent information.   Writer spoke to colleague who emailed all paperwork to Katie Ram at cancer [facility].  Yazmin got message confirming Katie received all paperwork in the email as writer was pseaking to Yazmin.  Yazmin admitted all paperwork was in order and writer ended the call.  TYREL Hubbard., KAYLA ([facility])

## 2025-05-07 NOTE — TELEPHONE ENCOUNTER
Received fax from Katie Ram at [facility] requesting more information. Called Katie to clarify, and they only received the office visit notes via fax yesterday. Sent email to [email or web address] with entire referral.

## 2025-05-12 ENCOUNTER — TELEPHONE (OUTPATIENT)
Dept: NEUROLOGY | Facility: CLINIC | Age: 70
End: 2025-05-12
Payer: COMMERCIAL

## 2025-05-12 NOTE — TELEPHONE ENCOUNTER
[facility] Call Center    Phone Message    May a detailed message be left on voicemail: yes     Reason for Call: Rain from [facility] [place] calling in. States that the prior authorization for the Truxima infusion was denied through [employer]. States that if needed, they are able to set up a aius-tk-cepj to discuss next steps or alternative infusion options. Please review as needed and reach back out to Echo to discuss at [phone] (8am-4:30pm).     Action Taken: Message routed to:  [facility] Neurology

## 2025-05-12 NOTE — LETTER
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 This letter outlines the medical necessity of Rituximab therapy based on clinical history, treatment response, and failure of current standard therapy.    Clinical Summary:  Chilo Moran has a well-established diagnosis of MMN, a rare, chronic, immune-mediated neuropathy characterized by slowly progressive, asymmetric weakness without significant sensory loss. The pathophysiology of MMN is believed to involve antibody-mediated disruption of motor nerve conduction. Rituximab, a B-cell depleting monoclonal antibody, has demonstrated benefit in selected patients with MMN, especially in those who are unresponsive or intolerant to intravenous immunoglobulin (IVIg).    Treatment History:  The patient has previously received Rituximab infusions with documented clinical improvement, including improved motor strength and functional status. Treatment was planned but paused during the COVID-19 pandemic due to concern over immunosuppression risks. Since that time, the patient has been managed solely with IVIg infusions, which are no longer sufficiently controlling disease progression. Symptoms of weakness have recurred, and the patient reports a noticeable decline in strength and dexterity affecting daily activities and quality of life.    Attempts to optimize IVIg therapy have been unsuccessful. A recent request to increase IVIg dosing frequency was submitted but denied by the insurance carrier, leaving us without a viable path forward using current treatment  alone.    Medical Necessity of Rituximab Given the Followin. Chilo' prior positive response to Rituximab  2. The inadequate control of MMN symptoms on IVIg alone  3. The lack of approved alternative dosing strategies under the current IVIg regimen due to denial of increased frequency  4. The supportive clinical literature and case reports describing Rituximab s utility in refractory MMN cases    Supportive clinical literature references:     SANDRA Lombardo, JACLYN Wilkinson, & Mallory, [place] (2007). Coping with neuroimmunological diseases. Journal of Neurology, 254(Suppl 2), -. https://doi.org/10.1007/h76207-939-2946-4    LIAT Cotton, & CLEM Boles (2013). Multifocal motor neuropathy: Current therapies and novel strategies. Drugs, 73(4), 397-406. https://doi.org/10.1007/j90675-616-2011-f    FABIOLA Roberts. S. S. (2007). Effect of galantamine on verbal repetition in Alzheimer's disease: A secondary analysis of the VISTA trial. Neurology, 69(8), 698-704. https://doi.org/10.1212/01.wnl.[email or web address]    BRENTON Euceda., AIDEE Cazares, MAURICIO Nation., JIMMY Miller. (2009). Beneficial effect of rituximab monotherapy in multifocal motor neuropathy,  Neuromuscular Disorders,19(7 473-475. https://doi.org/10.1016/j.nmd.2009.04.013    I strongly recommend resumption of Rituximab infusions as a medically necessary treatment. The safety profile of Rituximab is well-understood and manageable in this setting, and its immunomodulatory mechanism offers a rational, evidence-based alternative to high-dose or more frequent IVIg therapy.    Request:  We are requesting coverage and authorization for Rituximab infusions, dosed in accordance with neuromuscular clinical protocols (500 mg on Days 1 and 15, and then single infusions at regular intervals as clinically indicated), with ongoing monitoring.    If there is a need for additional documentation, ehfa-bi-clby discussion, or clarification, I am available and willing to speak  directly.     Thank you for your prompt attention to this medically necessary request.    Sincerely,    Lukas Hernandez MD  Board-Certified Neurologist  [facility]

## 2025-05-13 NOTE — TELEPHONE ENCOUNTER
[facility] Call Center    Phone Message    May a detailed message be left on voicemail: yes     Reason for Call: Elizabeth requesting  a status update, radha call Elizabeth at [phone] to discuss further.    Action Taken: Message routed to:  [facility] Neurology    Travel Screening: Not Applicable     Date of Service:

## 2025-05-13 NOTE — TELEPHONE ENCOUNTER
Writer spoke with Elizabeth. She is going to be faxing us the denial form from [facility]. She has updated the patient and informed him that we will be working on an appeal.      JUSTIN CastroN RN Care Coordinator  Neurology/Neurosurgery/PM&R/Pain Management

## 2025-05-13 NOTE — TELEPHONE ENCOUNTER
RN attempted to return call to Elizabeth but there was no answer. Left a VM with a request for a call back.      MADELAINE Castro RN Care Coordinator  Neurology/Neurosurgery/PM&R/Pain Management

## 2025-05-13 NOTE — TELEPHONE ENCOUNTER
A LMN has been routed to Dr. Hernandez for review.    JUSTIN CastroN RN Care Coordinator  Neurology/Neurosurgery/PM&R/Pain Management

## 2025-05-15 NOTE — TELEPHONE ENCOUNTER
Signed LMN and supporting articles have been faxed to Elizabeth at the [facility] at fax # [phone]. Confirmation of successful fax delivery received.      JUSTIN CastroN RN Care Coordinator  Neurology/Neurosurgery/PM&R/Pain Management

## 2025-05-27 ENCOUNTER — VIRTUAL VISIT (OUTPATIENT)
Dept: NEUROLOGY | Facility: CLINIC | Age: 70
End: 2025-05-27
Payer: COMMERCIAL

## 2025-05-27 DIAGNOSIS — G61.82 MMN (MULTIFOCAL MOTOR NEUROPATHY) (H): Primary | ICD-10-CM

## 2025-05-27 NOTE — PROGRESS NOTES
Black is a 70 year old who is being evaluated via a billable video visit.    How would you like to obtain your AVS? Akamediahart  If the video visit is dropped, the invitation should be resent by: Text to cell phone: [phone]  Will anyone else be joining your video visit? No    Video-Visit Details    Type of service:  Video Visit   Originating Location (pt. Location): Home    Distant Location (provider location):  On-site

## 2025-05-27 NOTE — PROGRESS NOTES
"Return visit with 70 year old man with MMN; received his \"boost\" dose of IVIg on May 18 and has improved. He was definitely weaker - difficulty running, kneeling, gripping, before the additional dose. Currently he is doing quite well, but when not doing well he is disabled and much in need of additional therapy. R-ODS completed today. Examination deferred as this is a virtual visit.     Impression: MMN, requiring high doses of IVIg to maintain clinical stability.    Plan: we are appealing insurance denial of Rituximab, which has reduced his IVIg dependence in the past.     Lukas Hernandez M.D.      12 minutes spent on the date of the encounter on chart review, history and examination, documentation and further activities as noted above.   "

## 2025-06-24 ENCOUNTER — MYC MEDICAL ADVICE (OUTPATIENT)
Dept: INFECTIOUS DISEASES | Facility: CLINIC | Age: 70
End: 2025-06-24
Payer: COMMERCIAL

## 2025-07-02 ENCOUNTER — DOCUMENTATION ONLY (OUTPATIENT)
Dept: MULTI SPECIALTY CLINIC | Facility: CLINIC | Age: 70
End: 2025-07-02
Payer: COMMERCIAL

## 2025-07-12 ENCOUNTER — HEALTH MAINTENANCE LETTER (OUTPATIENT)
Age: 70
End: 2025-07-12

## (undated) DEVICE — SU ETHIBOND 2-0 SHDA 30" X563H

## (undated) DEVICE — BNDG KLING 2" 2231

## (undated) DEVICE — SOL NACL 0.9% INJ 1000ML BAG 2B1324X

## (undated) DEVICE — DECANTER BAG 2002S

## (undated) DEVICE — DRSG GAUZE 4X4" 2187

## (undated) DEVICE — LINEN TOWEL PACK X5 5464

## (undated) DEVICE — SU CHROMIC 5-0 RB-1 27" U202H

## (undated) DEVICE — Device

## (undated) DEVICE — SOL WATER IRRIG 1000ML BOTTLE 2F7114

## (undated) DEVICE — PREP CHLORAPREP 26ML TINTED ORANGE  260815

## (undated) DEVICE — ESU CORD BIPOLAR GREEN 10-4000

## (undated) DEVICE — DRAPE LAP W/ARMBOARD 29410

## (undated) DEVICE — DRSG XEROFORM 1X8"

## (undated) DEVICE — MITT SURGICAL PREP HAIR REMOVER

## (undated) DEVICE — RX BACITRACIN OINTMENT 0.9G 1/32OZ CUR001109

## (undated) DEVICE — DRAIN PENROSE 0.25"X18" LATEX FREE GR201

## (undated) DEVICE — BLADE CLIPPER SGL USE 9680

## (undated) DEVICE — SUCTION MANIFOLD NEPTUNE 2 SYS 4 PORT 0702-020-000

## (undated) DEVICE — PACK MINOR CUSTOM ASC

## (undated) DEVICE — SU CHROMIC 3-0 PS-2 27" 1638H

## (undated) DEVICE — SYR 50ML LL W/O NDL 309653

## (undated) DEVICE — GLOVE PROTEXIS POWDER FREE SMT 7.5  2D72PT75X

## (undated) DEVICE — NDL BUTTERFLY 21GA .75" 367296

## (undated) DEVICE — ESU GROUND PAD ADULT W/CORD E7507

## (undated) DEVICE — SU PDS II 4-0 SH 27" Z315H

## (undated) RX ORDER — ACETAMINOPHEN 325 MG/1
TABLET ORAL
Status: DISPENSED
Start: 2019-04-16

## (undated) RX ORDER — HEPARIN SODIUM (PORCINE) LOCK FLUSH IV SOLN 100 UNIT/ML 100 UNIT/ML
SOLUTION INTRAVENOUS
Status: DISPENSED
Start: 2019-04-16

## (undated) RX ORDER — FENTANYL CITRATE 50 UG/ML
INJECTION, SOLUTION INTRAMUSCULAR; INTRAVENOUS
Status: DISPENSED
Start: 2019-04-16

## (undated) RX ORDER — CEFAZOLIN SODIUM 1 G/3ML
INJECTION, POWDER, FOR SOLUTION INTRAMUSCULAR; INTRAVENOUS
Status: DISPENSED
Start: 2019-04-16

## (undated) RX ORDER — PHENYLEPHRINE HCL IN 0.9% NACL 1 MG/10 ML
SYRINGE (ML) INTRAVENOUS
Status: DISPENSED
Start: 2019-04-16

## (undated) RX ORDER — PROPOFOL 10 MG/ML
INJECTION, EMULSION INTRAVENOUS
Status: DISPENSED
Start: 2019-04-16

## (undated) RX ORDER — GABAPENTIN 300 MG/1
CAPSULE ORAL
Status: DISPENSED
Start: 2019-04-16